# Patient Record
Sex: FEMALE | Race: WHITE | NOT HISPANIC OR LATINO | Employment: UNEMPLOYED | URBAN - METROPOLITAN AREA
[De-identification: names, ages, dates, MRNs, and addresses within clinical notes are randomized per-mention and may not be internally consistent; named-entity substitution may affect disease eponyms.]

---

## 2020-08-30 ENCOUNTER — OFFICE VISIT (OUTPATIENT)
Dept: URGENT CARE | Facility: CLINIC | Age: 25
End: 2020-08-30
Payer: COMMERCIAL

## 2020-08-30 VITALS
DIASTOLIC BLOOD PRESSURE: 74 MMHG | HEART RATE: 92 BPM | RESPIRATION RATE: 16 BRPM | SYSTOLIC BLOOD PRESSURE: 124 MMHG | OXYGEN SATURATION: 97 % | WEIGHT: 110 LBS | TEMPERATURE: 98 F

## 2020-08-30 DIAGNOSIS — F41.9 ANXIETY: ICD-10-CM

## 2020-08-30 DIAGNOSIS — R11.2 NAUSEA AND VOMITING, INTRACTABILITY OF VOMITING NOT SPECIFIED, UNSPECIFIED VOMITING TYPE: ICD-10-CM

## 2020-08-30 DIAGNOSIS — R19.7 DIARRHEA, UNSPECIFIED TYPE: ICD-10-CM

## 2020-08-30 LAB
APPEARANCE UR: NORMAL
BILIRUB UR STRIP-MCNC: NORMAL MG/DL
COLOR UR AUTO: YELLOW
GLUCOSE UR STRIP.AUTO-MCNC: NEGATIVE MG/DL
INT CON NEG: NEGATIVE
INT CON POS: POSITIVE
KETONES UR STRIP.AUTO-MCNC: 80 MG/DL
LEUKOCYTE ESTERASE UR QL STRIP.AUTO: NEGATIVE
NITRITE UR QL STRIP.AUTO: NEGATIVE
PH UR STRIP.AUTO: 5.5 [PH] (ref 5–8)
POC URINE PREGNANCY TEST: NEGATIVE
PROT UR QL STRIP: NORMAL MG/DL
RBC UR QL AUTO: NEGATIVE
SP GR UR STRIP.AUTO: 1.03
UROBILINOGEN UR STRIP-MCNC: 0.2 MG/DL

## 2020-08-30 PROCEDURE — 81002 URINALYSIS NONAUTO W/O SCOPE: CPT | Performed by: PHYSICIAN ASSISTANT

## 2020-08-30 PROCEDURE — 99204 OFFICE O/P NEW MOD 45 MIN: CPT | Performed by: PHYSICIAN ASSISTANT

## 2020-08-30 PROCEDURE — 81025 URINE PREGNANCY TEST: CPT | Performed by: PHYSICIAN ASSISTANT

## 2020-08-30 RX ORDER — HYDROXYZINE HYDROCHLORIDE 25 MG/1
25 TABLET, FILM COATED ORAL 3 TIMES DAILY PRN
Qty: 30 TAB | Refills: 0 | Status: SHIPPED | OUTPATIENT
Start: 2020-08-30 | End: 2020-11-07

## 2020-08-30 RX ORDER — ONDANSETRON 4 MG/1
4 TABLET, ORALLY DISINTEGRATING ORAL EVERY 8 HOURS PRN
Qty: 10 TAB | Refills: 0 | Status: SHIPPED | OUTPATIENT
Start: 2020-08-30 | End: 2020-11-08

## 2020-08-30 ASSESSMENT — ENCOUNTER SYMPTOMS
BACK PAIN: 0
VOMITING: 1
PALPITATIONS: 0
DIZZINESS: 0
COUGH: 0
BLOOD IN STOOL: 0
SHORTNESS OF BREATH: 0
ARTHRALGIAS: 0
SWEATS: 0
WHEEZING: 0
CHILLS: 0
BLOATING: 0
DIARRHEA: 1
ABDOMINAL PAIN: 1
SPUTUM PRODUCTION: 0
FEVER: 0
FLATUS: 0
HEADACHES: 0
MYALGIAS: 0

## 2020-08-30 NOTE — LETTER
August 30, 2020         Patient: Nanci Spencer   YOB: 1995   Date of Visit: 8/30/2020           To Whom it May Concern:    Nanci Spencer was seen in my clinic on 8/30/2020. She is excused from work from 8/30/2020-8/31/2020 due to medical reasons.     If you have any questions or concerns, please don't hesitate to call.        Sincerely,           Juana Irizarry P.A.-C.  Electronically Signed

## 2020-08-31 NOTE — PROGRESS NOTES
Subjective:      Nanci Spencer is a 24 y.o. female who presents with Abdominal Pain (x 1 day with diarrhea x 2 days and vomiting started today)            Diarrhea   This is a new problem. The current episode started yesterday. The problem occurs 2 to 4 times per day. The problem has been unchanged. The stool consistency is described as watery. The patient states that diarrhea does not awaken her from sleep. Associated symptoms include abdominal pain (upper abdominal pain - 5/10 pain) and vomiting. Pertinent negatives include no arthralgias, bloating, chills, coughing, fever, headaches, increased  flatus, myalgias, sweats or URI. There are no known risk factors (no recent antibiotic use, no ill contacts, no suspect food intake or recent hospitalization). Treatments tried: Zofran. The treatment provided mild relief.     The patient also reports having a lot of anxiety lately. She just moved here from out of town. She denies bloody stools. She was taking Klonopin daily for 3 weeks and stopped about 2 weeks ago. She reports this is similar to anxiety episodes in the past. She does smoke Marijuana multiple times per day.     No past medical history on file.    No past surgical history on file.  No family history on file.    No Known Allergies    Review of Systems   Constitutional: Negative for chills, fever and malaise/fatigue.   Respiratory: Negative for cough, sputum production, shortness of breath and wheezing.    Cardiovascular: Negative for chest pain, palpitations and leg swelling.   Gastrointestinal: Positive for abdominal pain (upper abdominal pain - 5/10 pain), diarrhea and vomiting. Negative for bloating, blood in stool, flatus and melena.   Genitourinary: Negative for dysuria, frequency, hematuria and urgency.   Musculoskeletal: Negative for arthralgias, back pain and myalgias.   Neurological: Negative for dizziness and headaches.     All other systems reviewed and are negative.          Objective:     BP  124/74 (BP Location: Left arm, Patient Position: Sitting, BP Cuff Size: Adult)   Pulse 92   Temp 36.7 °C (98 °F) (Temporal)   Resp 16   Wt 49.9 kg (110 lb)   SpO2 97%      Physical Exam  Constitutional:       General: She is not in acute distress.     Appearance: She is not ill-appearing.   HENT:      Head: Normocephalic and atraumatic.   Eyes:      Conjunctiva/sclera: Conjunctivae normal.   Cardiovascular:      Rate and Rhythm: Normal rate and regular rhythm.      Heart sounds: Normal heart sounds. No murmur. No friction rub. No gallop.    Pulmonary:      Effort: Pulmonary effort is normal. No respiratory distress.      Breath sounds: Normal breath sounds. No wheezing, rhonchi or rales.   Abdominal:      General: There is no distension.      Palpations: Abdomen is soft. There is no mass.      Tenderness: There is abdominal tenderness (mild TTP in epigastric and periumbilical areas). There is no right CVA tenderness, left CVA tenderness, guarding or rebound. Negative signs include Zapata's sign and McBurney's sign.   Musculoskeletal: Normal range of motion.   Skin:     General: Skin is warm and dry.   Neurological:      General: No focal deficit present.      Mental Status: She is alert and oriented to person, place, and time.   Psychiatric:         Mood and Affect: Mood normal.         Behavior: Behavior normal.         Thought Content: Thought content normal.         Judgment: Judgment normal.       UA: negative leuks, negative nitrates, no blood  HCG- negative            Assessment/Plan:        1. Nausea and vomiting, intractability of vomiting not specified, unspecified vomiting type  ondansetron (ZOFRAN ODT) 4 MG TABLET DISPERSIBLE    POCT Urinalysis    POCT Pregnancy   2. Diarrhea, unspecified type     3. Anxiety  hydrOXYzine HCl (ATARAX) 25 MG Tab       DDX: gastroenteritis, Anxiety, Cyclic vomiting syndrome from marijuana use.    Encouraged fluids  RX Zofran  RX Hydroxyzine  ER if worsening pain, fever,  or inability to keep fluids down.    Differential diagnoses, Supportive care, and indications for immediate follow-up discussed with patient.   Pathogenesis of diagnosis discussed including typical length and natural progression.   Instructed to return to clinic or nearest emergency department for any change in condition, further concerns, or worsening of symptoms.    The patient demonstrated a good understanding and agreed with the treatment plan.    Juana Irizarry P.A.-C.

## 2020-11-07 ENCOUNTER — HOSPITAL ENCOUNTER (EMERGENCY)
Facility: MEDICAL CENTER | Age: 25
End: 2020-11-07
Attending: EMERGENCY MEDICINE
Payer: COMMERCIAL

## 2020-11-07 ENCOUNTER — APPOINTMENT (OUTPATIENT)
Dept: RADIOLOGY | Facility: MEDICAL CENTER | Age: 25
End: 2020-11-07
Attending: EMERGENCY MEDICINE
Payer: COMMERCIAL

## 2020-11-07 VITALS
HEART RATE: 99 BPM | WEIGHT: 113.54 LBS | DIASTOLIC BLOOD PRESSURE: 78 MMHG | OXYGEN SATURATION: 99 % | HEIGHT: 66 IN | TEMPERATURE: 98.2 F | SYSTOLIC BLOOD PRESSURE: 129 MMHG | RESPIRATION RATE: 15 BRPM | BODY MASS INDEX: 18.25 KG/M2

## 2020-11-07 DIAGNOSIS — O21.0 HYPEREMESIS GRAVIDARUM: ICD-10-CM

## 2020-11-07 LAB
ALBUMIN SERPL BCP-MCNC: 4.4 G/DL (ref 3.2–4.9)
ALBUMIN/GLOB SERPL: 1.6 G/DL
ALP SERPL-CCNC: 65 U/L (ref 30–99)
ALT SERPL-CCNC: 19 U/L (ref 2–50)
ANION GAP SERPL CALC-SCNC: 17 MMOL/L (ref 7–16)
APPEARANCE UR: ABNORMAL
AST SERPL-CCNC: 22 U/L (ref 12–45)
B-HCG SERPL-ACNC: ABNORMAL MIU/ML (ref 0–5)
BACTERIA #/AREA URNS HPF: ABNORMAL /HPF
BASOPHILS # BLD AUTO: 0.3 % (ref 0–1.8)
BASOPHILS # BLD: 0.05 K/UL (ref 0–0.12)
BILIRUB SERPL-MCNC: 0.4 MG/DL (ref 0.1–1.5)
BILIRUB UR QL STRIP.AUTO: NEGATIVE
BUN SERPL-MCNC: 10 MG/DL (ref 8–22)
CALCIUM SERPL-MCNC: 9.4 MG/DL (ref 8.5–10.5)
CHLORIDE SERPL-SCNC: 101 MMOL/L (ref 96–112)
CO2 SERPL-SCNC: 18 MMOL/L (ref 20–33)
COLOR UR: YELLOW
CREAT SERPL-MCNC: 0.65 MG/DL (ref 0.5–1.4)
EOSINOPHIL # BLD AUTO: 0 K/UL (ref 0–0.51)
EOSINOPHIL NFR BLD: 0 % (ref 0–6.9)
EPI CELLS #/AREA URNS HPF: ABNORMAL /HPF
ERYTHROCYTE [DISTWIDTH] IN BLOOD BY AUTOMATED COUNT: 40.6 FL (ref 35.9–50)
GLOBULIN SER CALC-MCNC: 2.7 G/DL (ref 1.9–3.5)
GLUCOSE SERPL-MCNC: 95 MG/DL (ref 65–99)
GLUCOSE UR STRIP.AUTO-MCNC: NEGATIVE MG/DL
HCT VFR BLD AUTO: 42 % (ref 37–47)
HGB BLD-MCNC: 14.1 G/DL (ref 12–16)
HYALINE CASTS #/AREA URNS LPF: ABNORMAL /LPF
IMM GRANULOCYTES # BLD AUTO: 0.1 K/UL (ref 0–0.11)
IMM GRANULOCYTES NFR BLD AUTO: 0.6 % (ref 0–0.9)
KETONES UR STRIP.AUTO-MCNC: >=160 MG/DL
LEUKOCYTE ESTERASE UR QL STRIP.AUTO: NEGATIVE
LIPASE SERPL-CCNC: 18 U/L (ref 11–82)
LYMPHOCYTES # BLD AUTO: 1.09 K/UL (ref 1–4.8)
LYMPHOCYTES NFR BLD: 6.9 % (ref 22–41)
MCH RBC QN AUTO: 29.7 PG (ref 27–33)
MCHC RBC AUTO-ENTMCNC: 33.6 G/DL (ref 33.6–35)
MCV RBC AUTO: 88.6 FL (ref 81.4–97.8)
MICRO URNS: ABNORMAL
MONOCYTES # BLD AUTO: 0.68 K/UL (ref 0–0.85)
MONOCYTES NFR BLD AUTO: 4.3 % (ref 0–13.4)
MUCOUS THREADS #/AREA URNS HPF: ABNORMAL /HPF
NEUTROPHILS # BLD AUTO: 13.84 K/UL (ref 2–7.15)
NEUTROPHILS NFR BLD: 87.9 % (ref 44–72)
NITRITE UR QL STRIP.AUTO: NEGATIVE
NRBC # BLD AUTO: 0 K/UL
NRBC BLD-RTO: 0 /100 WBC
PH UR STRIP.AUTO: 6 [PH] (ref 5–8)
PLATELET # BLD AUTO: 343 K/UL (ref 164–446)
PMV BLD AUTO: 9.7 FL (ref 9–12.9)
POTASSIUM SERPL-SCNC: 3.6 MMOL/L (ref 3.6–5.5)
PROT SERPL-MCNC: 7.1 G/DL (ref 6–8.2)
PROT UR QL STRIP: 30 MG/DL
RBC # BLD AUTO: 4.74 M/UL (ref 4.2–5.4)
RBC # URNS HPF: ABNORMAL /HPF
RBC UR QL AUTO: NEGATIVE
SODIUM SERPL-SCNC: 136 MMOL/L (ref 135–145)
SP GR UR STRIP.AUTO: 1.03
UROBILINOGEN UR STRIP.AUTO-MCNC: 0.2 MG/DL
WBC # BLD AUTO: 15.8 K/UL (ref 4.8–10.8)
WBC #/AREA URNS HPF: ABNORMAL /HPF

## 2020-11-07 PROCEDURE — 96361 HYDRATE IV INFUSION ADD-ON: CPT

## 2020-11-07 PROCEDURE — 99285 EMERGENCY DEPT VISIT HI MDM: CPT

## 2020-11-07 PROCEDURE — 700111 HCHG RX REV CODE 636 W/ 250 OVERRIDE (IP)

## 2020-11-07 PROCEDURE — 700105 HCHG RX REV CODE 258: Performed by: EMERGENCY MEDICINE

## 2020-11-07 PROCEDURE — 96376 TX/PRO/DX INJ SAME DRUG ADON: CPT

## 2020-11-07 PROCEDURE — 80053 COMPREHEN METABOLIC PANEL: CPT

## 2020-11-07 PROCEDURE — 700111 HCHG RX REV CODE 636 W/ 250 OVERRIDE (IP): Performed by: EMERGENCY MEDICINE

## 2020-11-07 PROCEDURE — 83690 ASSAY OF LIPASE: CPT

## 2020-11-07 PROCEDURE — 85025 COMPLETE CBC W/AUTO DIFF WBC: CPT

## 2020-11-07 PROCEDURE — 87086 URINE CULTURE/COLONY COUNT: CPT

## 2020-11-07 PROCEDURE — 96375 TX/PRO/DX INJ NEW DRUG ADDON: CPT

## 2020-11-07 PROCEDURE — 84702 CHORIONIC GONADOTROPIN TEST: CPT

## 2020-11-07 PROCEDURE — 96374 THER/PROPH/DIAG INJ IV PUSH: CPT

## 2020-11-07 PROCEDURE — 76801 OB US < 14 WKS SINGLE FETUS: CPT

## 2020-11-07 PROCEDURE — 81001 URINALYSIS AUTO W/SCOPE: CPT

## 2020-11-07 RX ORDER — DIPHENHYDRAMINE HYDROCHLORIDE 50 MG/ML
25 INJECTION INTRAMUSCULAR; INTRAVENOUS ONCE
Status: COMPLETED | OUTPATIENT
Start: 2020-11-07 | End: 2020-11-07

## 2020-11-07 RX ORDER — QUETIAPINE FUMARATE 50 MG/1
100 TABLET, FILM COATED ORAL
COMMUNITY
End: 2020-12-08 | Stop reason: SDUPTHER

## 2020-11-07 RX ORDER — SODIUM CHLORIDE, SODIUM LACTATE, POTASSIUM CHLORIDE, CALCIUM CHLORIDE 600; 310; 30; 20 MG/100ML; MG/100ML; MG/100ML; MG/100ML
2000 INJECTION, SOLUTION INTRAVENOUS ONCE
Status: COMPLETED | OUTPATIENT
Start: 2020-11-07 | End: 2020-11-07

## 2020-11-07 RX ORDER — ONDANSETRON 2 MG/ML
4 INJECTION INTRAMUSCULAR; INTRAVENOUS ONCE
Status: COMPLETED | OUTPATIENT
Start: 2020-11-07 | End: 2020-11-07

## 2020-11-07 RX ADMIN — ONDANSETRON 4 MG: 2 INJECTION INTRAMUSCULAR; INTRAVENOUS at 20:06

## 2020-11-07 RX ADMIN — SODIUM CHLORIDE, POTASSIUM CHLORIDE, SODIUM LACTATE AND CALCIUM CHLORIDE 2000 ML: 600; 310; 30; 20 INJECTION, SOLUTION INTRAVENOUS at 17:38

## 2020-11-07 RX ADMIN — ONDANSETRON 4 MG: 2 INJECTION INTRAMUSCULAR; INTRAVENOUS at 17:37

## 2020-11-07 RX ADMIN — DIPHENHYDRAMINE HYDROCHLORIDE 25 MG: 50 INJECTION INTRAMUSCULAR; INTRAVENOUS at 20:35

## 2020-11-08 ENCOUNTER — HOSPITAL ENCOUNTER (EMERGENCY)
Facility: MEDICAL CENTER | Age: 25
End: 2020-11-08
Attending: EMERGENCY MEDICINE
Payer: COMMERCIAL

## 2020-11-08 ENCOUNTER — HOSPITAL ENCOUNTER (EMERGENCY)
Facility: MEDICAL CENTER | Age: 25
End: 2020-11-09
Attending: EMERGENCY MEDICINE | Admitting: EMERGENCY MEDICINE
Payer: COMMERCIAL

## 2020-11-08 VITALS
HEIGHT: 66 IN | OXYGEN SATURATION: 99 % | WEIGHT: 113.54 LBS | RESPIRATION RATE: 18 BRPM | BODY MASS INDEX: 18.25 KG/M2 | SYSTOLIC BLOOD PRESSURE: 128 MMHG | DIASTOLIC BLOOD PRESSURE: 66 MMHG | HEART RATE: 81 BPM

## 2020-11-08 DIAGNOSIS — R11.2 NAUSEA AND VOMITING, INTRACTABILITY OF VOMITING NOT SPECIFIED, UNSPECIFIED VOMITING TYPE: ICD-10-CM

## 2020-11-08 DIAGNOSIS — R11.2 NAUSEA AND VOMITING IN ADULT: ICD-10-CM

## 2020-11-08 DIAGNOSIS — Z34.91 FIRST TRIMESTER PREGNANCY: ICD-10-CM

## 2020-11-08 DIAGNOSIS — Z3A.01 LESS THAN 8 WEEKS GESTATION OF PREGNANCY: ICD-10-CM

## 2020-11-08 DIAGNOSIS — R10.13 EPIGASTRIC ABDOMINAL PAIN: ICD-10-CM

## 2020-11-08 LAB
ALBUMIN SERPL BCP-MCNC: 3.7 G/DL (ref 3.2–4.9)
ALBUMIN/GLOB SERPL: 1.5 G/DL
ALP SERPL-CCNC: 56 U/L (ref 30–99)
ALT SERPL-CCNC: 17 U/L (ref 2–50)
ANION GAP SERPL CALC-SCNC: 13 MMOL/L (ref 7–16)
AST SERPL-CCNC: 21 U/L (ref 12–45)
BASOPHILS # BLD AUTO: 0.3 % (ref 0–1.8)
BASOPHILS # BLD: 0.05 K/UL (ref 0–0.12)
BILIRUB SERPL-MCNC: 0.5 MG/DL (ref 0.1–1.5)
BUN SERPL-MCNC: 8 MG/DL (ref 8–22)
CALCIUM SERPL-MCNC: 8.5 MG/DL (ref 8.4–10.2)
CHLORIDE SERPL-SCNC: 103 MMOL/L (ref 96–112)
CO2 SERPL-SCNC: 18 MMOL/L (ref 20–33)
CREAT SERPL-MCNC: 0.7 MG/DL (ref 0.5–1.4)
EOSINOPHIL # BLD AUTO: 0.01 K/UL (ref 0–0.51)
EOSINOPHIL NFR BLD: 0.1 % (ref 0–6.9)
ERYTHROCYTE [DISTWIDTH] IN BLOOD BY AUTOMATED COUNT: 40.1 FL (ref 35.9–50)
GLOBULIN SER CALC-MCNC: 2.4 G/DL (ref 1.9–3.5)
GLUCOSE SERPL-MCNC: 109 MG/DL (ref 65–99)
HCT VFR BLD AUTO: 36.6 % (ref 37–47)
HGB BLD-MCNC: 12.3 G/DL (ref 12–16)
IMM GRANULOCYTES # BLD AUTO: 0.11 K/UL (ref 0–0.11)
IMM GRANULOCYTES NFR BLD AUTO: 0.7 % (ref 0–0.9)
LIPASE SERPL-CCNC: 15 U/L (ref 7–58)
LYMPHOCYTES # BLD AUTO: 1.31 K/UL (ref 1–4.8)
LYMPHOCYTES NFR BLD: 8.4 % (ref 22–41)
MCH RBC QN AUTO: 29.6 PG (ref 27–33)
MCHC RBC AUTO-ENTMCNC: 33.6 G/DL (ref 33.6–35)
MCV RBC AUTO: 88.2 FL (ref 81.4–97.8)
MONOCYTES # BLD AUTO: 0.53 K/UL (ref 0–0.85)
MONOCYTES NFR BLD AUTO: 3.4 % (ref 0–13.4)
NEUTROPHILS # BLD AUTO: 13.62 K/UL (ref 2–7.15)
NEUTROPHILS NFR BLD: 87.1 % (ref 44–72)
NRBC # BLD AUTO: 0 K/UL
NRBC BLD-RTO: 0 /100 WBC
PLATELET # BLD AUTO: 274 K/UL (ref 164–446)
PMV BLD AUTO: 9.8 FL (ref 9–12.9)
POTASSIUM SERPL-SCNC: 3.7 MMOL/L (ref 3.6–5.5)
PROT SERPL-MCNC: 6.1 G/DL (ref 6–8.2)
RBC # BLD AUTO: 4.15 M/UL (ref 4.2–5.4)
SODIUM SERPL-SCNC: 134 MMOL/L (ref 135–145)
WBC # BLD AUTO: 15.6 K/UL (ref 4.8–10.8)

## 2020-11-08 PROCEDURE — 96375 TX/PRO/DX INJ NEW DRUG ADDON: CPT

## 2020-11-08 PROCEDURE — 80053 COMPREHEN METABOLIC PANEL: CPT

## 2020-11-08 PROCEDURE — 99284 EMERGENCY DEPT VISIT MOD MDM: CPT

## 2020-11-08 PROCEDURE — 85025 COMPLETE CBC W/AUTO DIFF WBC: CPT

## 2020-11-08 PROCEDURE — 83690 ASSAY OF LIPASE: CPT | Mod: 91

## 2020-11-08 PROCEDURE — 96374 THER/PROPH/DIAG INJ IV PUSH: CPT

## 2020-11-08 PROCEDURE — A9270 NON-COVERED ITEM OR SERVICE: HCPCS | Performed by: EMERGENCY MEDICINE

## 2020-11-08 PROCEDURE — 84702 CHORIONIC GONADOTROPIN TEST: CPT

## 2020-11-08 PROCEDURE — 80053 COMPREHEN METABOLIC PANEL: CPT | Mod: 91

## 2020-11-08 PROCEDURE — 700111 HCHG RX REV CODE 636 W/ 250 OVERRIDE (IP): Performed by: EMERGENCY MEDICINE

## 2020-11-08 PROCEDURE — 36415 COLL VENOUS BLD VENIPUNCTURE: CPT

## 2020-11-08 PROCEDURE — 700102 HCHG RX REV CODE 250 W/ 637 OVERRIDE(OP): Performed by: EMERGENCY MEDICINE

## 2020-11-08 PROCEDURE — 85025 COMPLETE CBC W/AUTO DIFF WBC: CPT | Mod: 91

## 2020-11-08 PROCEDURE — 83690 ASSAY OF LIPASE: CPT

## 2020-11-08 RX ORDER — PYRIDOXINE HCL (VITAMIN B6) 25 MG
25 TABLET ORAL ONCE
Status: COMPLETED | OUTPATIENT
Start: 2020-11-08 | End: 2020-11-08

## 2020-11-08 RX ORDER — METOCLOPRAMIDE HYDROCHLORIDE 5 MG/ML
10 INJECTION INTRAMUSCULAR; INTRAVENOUS ONCE
Status: COMPLETED | OUTPATIENT
Start: 2020-11-09 | End: 2020-11-09

## 2020-11-08 RX ORDER — LORAZEPAM 2 MG/ML
1 INJECTION INTRAMUSCULAR ONCE
Status: COMPLETED | OUTPATIENT
Start: 2020-11-09 | End: 2020-11-09

## 2020-11-08 RX ORDER — ONDANSETRON 4 MG/1
4 TABLET, ORALLY DISINTEGRATING ORAL EVERY 6 HOURS PRN
Qty: 20 TAB | Refills: 0 | Status: SHIPPED | OUTPATIENT
Start: 2020-11-08

## 2020-11-08 RX ORDER — ONDANSETRON 2 MG/ML
4 INJECTION INTRAMUSCULAR; INTRAVENOUS ONCE
Status: COMPLETED | OUTPATIENT
Start: 2020-11-08 | End: 2020-11-08

## 2020-11-08 RX ORDER — DIPHENHYDRAMINE HYDROCHLORIDE 50 MG/ML
25 INJECTION INTRAMUSCULAR; INTRAVENOUS ONCE
Status: COMPLETED | OUTPATIENT
Start: 2020-11-09 | End: 2020-11-09

## 2020-11-08 RX ORDER — SODIUM CHLORIDE 9 MG/ML
1000 INJECTION, SOLUTION INTRAVENOUS ONCE
Status: COMPLETED | OUTPATIENT
Start: 2020-11-09 | End: 2020-11-09

## 2020-11-08 RX ORDER — LORAZEPAM 2 MG/ML
0.5 INJECTION INTRAMUSCULAR ONCE
Status: COMPLETED | OUTPATIENT
Start: 2020-11-08 | End: 2020-11-08

## 2020-11-08 RX ADMIN — ONDANSETRON 4 MG: 2 INJECTION INTRAMUSCULAR; INTRAVENOUS at 04:19

## 2020-11-08 RX ADMIN — Medication 25 MG: at 04:19

## 2020-11-08 RX ADMIN — LIDOCAINE HYDROCHLORIDE 30 ML: 20 SOLUTION OROPHARYNGEAL at 04:20

## 2020-11-08 RX ADMIN — LORAZEPAM 0.5 MG: 2 INJECTION INTRAMUSCULAR; INTRAVENOUS at 04:52

## 2020-11-08 SDOH — HEALTH STABILITY: MENTAL HEALTH: HOW OFTEN DO YOU HAVE A DRINK CONTAINING ALCOHOL?: NEVER

## 2020-11-08 ASSESSMENT — ENCOUNTER SYMPTOMS
ABDOMINAL PAIN: 1
NAUSEA: 1
VOMITING: 1
FEVER: 0
DIZZINESS: 0
SHORTNESS OF BREATH: 0
BACK PAIN: 0
HEADACHES: 0

## 2020-11-08 ASSESSMENT — PAIN DESCRIPTION - DESCRIPTORS: DESCRIPTORS: BURNING

## 2020-11-08 ASSESSMENT — FIBROSIS 4 INDEX
FIB4 SCORE: 0.43
FIB4 SCORE: 0.34

## 2020-11-08 NOTE — ED TRIAGE NOTES
"Nanci Spencer 24 y.o. female ambulatory to triage for     Chief Complaint   Patient presents with   • Abdominal Pain     epigastric pain, x 48 hours, ache, constant, better after vomiting then reoccurs within 2-3 minutes   • Nausea/Vomiting/Diarrhea     x 48 hours, unable to keep any food down, last emesis 1 hour ago, last watery BM this morning   • Tingling     hands/fingers tingling, pt hyperventilating   • Sweats     Pt anxious, c/o having frequent panic attacks.  Pt reports approx 7 weeks pregnant, moved to [place] approx 2 months ago and does not have OB/GYN or pre- care.  Pt has an old prescription for zofran which she has been taking, last dose this morning.  /98   Pulse (!) 137   Temp 36.5 °C (97.7 °F) (Temporal)   Resp 16   Ht 1.676 m (5' 6\")   Wt 51.5 kg (113 lb 8.6 oz)   LMP 2020 (Approximate)   SpO2 98%   BMI 18.33 kg/m²   Protocol orders placed.  Pt returned to the lobby to await bed assignment.  Advised to return to the triage desk for any changes/concerns.  "

## 2020-11-08 NOTE — ED NOTES
RN at bedside, pt is still quite nauseated after previous zofran administration. Dr. Bermudez notified for further orders.

## 2020-11-08 NOTE — ED PROVIDER NOTES
ED Provider Note    Primary care provider: Pcp Pt States None  Means of arrival: EMS  History obtained from: patient  History limited by: none    CHIEF COMPLAINT  Chief Complaint   Patient presents with   • N/V       HPI  Nanci Spencer is a  24 y.o. female who presents to the Emergency Department 24-year-old female who comes in for evaluation of abdominal pain, nausea and vomiting.  Patient is 7 weeks and 6 days by ultrasound that was done yesterday.  Patient seen in the emergency department for epigastric pain, nausea and vomiting yesterday and after being treated symptomatically was feeling improved and subsequently discharged.  She was thought to have hyperemesis gravidarum related to pregnancy.  Patient returns tonight with ongoing epigastric pain that she describes as burning and moderate in severity.  With inability to tolerate oral intake throughout the day.  She denies any vaginal bleeding or discharge.  Has no lower abdominal cramping.  Patient does have a history of smoking marijuana and is still continues to smoke.  Denies fevers, chills, dysuria, cough, chest pain.    REVIEW OF SYSTEMS  Review of Systems   Constitutional: Negative for fever.   Respiratory: Negative for shortness of breath.    Cardiovascular: Negative for chest pain.   Gastrointestinal: Positive for abdominal pain, nausea and vomiting.   Genitourinary: Negative for dysuria.   Musculoskeletal: Negative for back pain.   Neurological: Negative for dizziness and headaches.   All other systems reviewed and are negative.        PAST MEDICAL HISTORY   None    SURGICAL HISTORY  patient denies any surgical history    SOCIAL HISTORY  Social History     Tobacco Use   • Smoking status: No   Substance Use Topics   • Alcohol use: No   • Drug use: No      Social History     Substance and Sexual Activity   Drug Use  positive for marijuana use       FAMILY HISTORY  None pertinent    CURRENT MEDICATIONS  Home Medications     Reviewed by Lb AVALOS  "EDITH Lim (Registered Nurse) on 11/08/20 at 0426  Med List Status: Partial   Medication Last Dose Status   ondansetron (ZOFRAN ODT) 4 MG TABLET DISPERSIBLE  Active   QUEtiapine (SEROQUEL) 50 MG tablet  Active                ALLERGIES  No Known Allergies    PHYSICAL EXAM  VITAL SIGNS: /70   Pulse 87   Resp 20   Ht 1.676 m (5' 6\")   Wt 51.5 kg (113 lb 8.6 oz)   LMP 09/27/2020 (Approximate)   SpO2 99%   Breastfeeding No   BMI 18.33 kg/m²   Vitals reviewed by myself.  Physical Exam   Constitutional: She is oriented to person, place, and time.   Patient is anxious appearing, dry heaving on exam   HENT:   Head: Normocephalic and atraumatic.   Eyes: Pupils are equal, round, and reactive to light. EOM are normal.   Neck: Normal range of motion. Neck supple.   Cardiovascular: Normal rate, regular rhythm and normal heart sounds. Exam reveals no gallop and no friction rub.   No murmur heard.  Pulmonary/Chest: Effort normal and breath sounds normal. No respiratory distress. She has no wheezes. She has no rales.   Abdominal: Soft. She exhibits no distension. There is no rebound and no guarding.   Mild epigastric tenderness without rebound or guarding, nonperitoneal   Musculoskeletal: Normal range of motion.         General: No edema.   Neurological: She is alert and oriented to person, place, and time.         DIAGNOSTIC STUDIES /  LABS  Labs Reviewed   CBC WITH DIFFERENTIAL - Abnormal; Notable for the following components:       Result Value    WBC 15.6 (*)     RBC 4.15 (*)     Hematocrit 36.6 (*)     Neutrophils-Polys 87.10 (*)     Lymphocytes 8.40 (*)     Neutrophils (Absolute) 13.62 (*)     All other components within normal limits   COMP METABOLIC PANEL - Abnormal; Notable for the following components:    Sodium 134 (*)     Co2 18 (*)     Glucose 109 (*)     All other components within normal limits   LIPASE   ESTIMATED GFR   URINALYSIS,CULTURE IF INDICATED       All labs reviewed by " me.      RADIOLOGY  none    COURSE & MEDICAL DECISION MAKING  Nursing notes, VS, PMSFHx reviewed in chart.    Patient is a 24-year-old female at 7 weeks pregnant who presents for nausea, vomiting and abdominal pain.  Differential diagnosis includes cyclic vomiting syndrome, gastritis, normal pregnancy, hyperemesis gravidarum, electrolyte disturbance, pancreatitis, biliary pathology.  Diagnostic work-up includes labs.    Patient's initial vitals are within normal limits.  She is anxious appearing and dry heaving.  Patient is treated with GI cocktail, Zofran, doxylamine, B6 and Ativan.  After treatment patient is feeling greatly improved.  Labs returned and patient has a leukocytosis of 15.6, this is relatively unchanged from yesterday when it was 15.8, likely reactive secondary to her nausea and vomiting.  Her anion gap was elevated yesterday related to dehydration this is subsequently resolved today.  Labs are otherwise relatively unremarkable.  Lipase is within normal limits as are her LFTs making biliary pathology unlikely.  Upon reassessment patient is feeling improved and tolerating oral intake.  I had a long discussion with her regarding the importance of marijuana cessation to prevent cyclic vomiting and also because she is pregnant.  Patient is amenable to this plan.  She is provided with prescriptions for Zofran and doxylamine and will follow up with her obstetrician tomorrow as planned.  Patient is then given strict return precautions and discharged in stable condition.      FINAL IMPRESSION  1. Nausea and vomiting, intractability of vomiting not specified, unspecified vomiting type    2. Less than 8 weeks gestation of pregnancy

## 2020-11-08 NOTE — ED NOTES
Patient is stable for discharge at this time, anticipatory guidance provided, close follow-up is encouraged, and ED return instructions have been detailed. Patient is both agreeable to the disposition and plan and discharged home in ambulatory state and in good condition.     Rx education provided, Pt verbalized understanding;

## 2020-11-08 NOTE — ED PROVIDER NOTES
ED Provider Note    CHIEF COMPLAINT  Chief Complaint   Patient presents with   • Abdominal Pain     epigastric pain, x 48 hours, ache, constant, better after vomiting then reoccurs within 2-3 minutes   • Nausea/Vomiting/Diarrhea     x 48 hours, unable to keep any food down, last emesis 1 hour ago, last watery BM this morning   • Tingling     hands/fingers tingling, pt hyperventilating   • Sweats       SORIN Spencer is a 24 y.o. female who presents with intractable nausea and vomiting and epigastric pain.  Started about 48 hours ago.  Patient states that her last menstrual cycle was September 27 and she is currently pregnant -approximately 6 weeks gestation by dates.  She has been followed by [facility] and had a transvaginal ultrasound performed in the past confirming an IUP.  This is her first pregnancy.  Denies prior abdominal surgeries in the past.  No fevers.  No bloody emesis or stool.  Had 1 or 2 episodes of diarrhea over the past 2 days however has had over a dozen episodes of vomiting in the past 48 hours.  No vaginal bleeding or discharge.  No dysuria or hematuria.  Denies taking any routine medications other than Seroquel which she takes for chronic anxiety.  She denies taking over-the-counter medications such as aspirin or ibuprofen.    REVIEW OF SYSTEMS  See HPI for further details. All other systems are negative.     PAST MEDICAL HISTORY       SOCIAL HISTORY  Social History     Tobacco Use   • Smoking status: Not on file   Substance and Sexual Activity   • Alcohol use: Not on file   • Drug use: Not on file   • Sexual activity: Not on file       SURGICAL HISTORY  patient denies any surgical history    CURRENT MEDICATIONS  Home Medications     Reviewed by Doreen Zhao R.N. (Registered Nurse) on 11/07/20 at 1647  Med List Status: Complete   Medication Last Dose Status   ondansetron (ZOFRAN ODT) 4 MG TABLET DISPERSIBLE 11/7/2020 Active   QUEtiapine (SEROQUEL) 50 MG tablet 11/6/2020 Active     "            ALLERGIES  No Known Allergies    PHYSICAL EXAM  VITAL SIGNS: /98   Pulse (!) 137   Temp 36.5 °C (97.7 °F) (Temporal)   Resp 16   Ht 1.676 m (5' 6\")   Wt 51.5 kg (113 lb 8.6 oz)   LMP 09/27/2020 (Approximate)   SpO2 98%   BMI 18.33 kg/m²   Pulse ox interpretation: I interpret this pulse ox as normal.  Constitutional: Alert in no apparent distress.  HENT: No signs of trauma, Bilateral external ears normal, Nose normal.   Eyes: Pupils are equal and reactive, Conjunctiva normal, Non-icteric.   Neck: Normal range of motion, No tenderness, Supple, No stridor.   Cardiovascular: Tachycardic rate and regular rhythm.   Thorax & Lungs: Normal breath sounds, No respiratory distress, No wheezing, No chest tenderness.   Abdomen: Bowel sounds normal, Soft, No tenderness, No masses, No pulsatile masses. No peritoneal signs.  Skin: Warm, Dry, No erythema, No rash.   Back: No bony tenderness, No CVA tenderness.   Extremities: Intact distal pulses, No edema, No tenderness, No cyanosis  Musculoskeletal: Good range of motion in all major joints. No tenderness to palpation or major deformities noted.   Neurologic: Alert, Normal motor function and gait, Normal sensory function, No focal deficits noted.   Psychiatric: Anxious in appearance.      DIAGNOSTIC STUDIES / PROCEDURES    EKG - Physician interpretation  No results found for this or any previous visit.      LABS  Labs Reviewed   CBC WITH DIFFERENTIAL - Abnormal; Notable for the following components:       Result Value    WBC 15.8 (*)     Neutrophils-Polys 87.90 (*)     Lymphocytes 6.90 (*)     Neutrophils (Absolute) 13.84 (*)     All other components within normal limits   COMP METABOLIC PANEL - Abnormal; Notable for the following components:    Co2 18 (*)     Anion Gap 17.0 (*)     All other components within normal limits   HCG QUANTITATIVE - Abnormal; Notable for the following components:    Bhcg 634483.0 (*)     All other components within normal limits "   URINALYSIS,CULTURE IF INDICATED - Abnormal; Notable for the following components:    Character Cloudy (*)     Protein 30 (*)     All other components within normal limits    Narrative:     Indication for culture:->Pregnant women: fever and/or  asymptomatic screening   URINE MICROSCOPIC (W/UA) - Abnormal; Notable for the following components:    WBC 10-20 (*)     RBC 5-10 (*)     Bacteria Many (*)     Epithelial Cells Many (*)     All other components within normal limits    Narrative:     Indication for culture:->Pregnant women: fever and/or  asymptomatic screening   LIPASE   ESTIMATED GFR   URINE CULTURE(NEW)         RADIOLOGY  US-OB 1ST TRIMESTER WITH TRANSVAGINAL (COMBO)   Final Result      Viable single intrauterine gestation of an estimated 7 weeks 5 days.            COURSE & MEDICAL DECISION MAKING    Medications   lactated ringers infusion (BOLUS) (0 mL Intravenous Stopped 11/7/20 2049)   ondansetron (ZOFRAN) syringe/vial injection 4 mg (4 mg Intravenous Given 11/7/20 1737)   ondansetron (ZOFRAN) syringe/vial injection 4 mg (4 mg Intravenous Given 11/7/20 2006)   diphenhydrAMINE (BENADRYL) injection 25 mg (25 mg Intravenous Given 11/7/20 2035)       Pertinent Labs & Imaging studies reviewed. (See chart for details)  24 y.o. female presenting with anxiety and intractable nausea and vomiting for the past 2 days.  Also with epigastric pain.  She states that she takes Seroquel.  She also notes that she is approximately 6 weeks pregnant.  She is followed by the pregnancy center.  She presented with tachycardia and appears very anxious.  She was given IV fluid hydration due to concerns of dehydration from repeated bouts of emesis.    HYDRATION: Based on the patient's presentation of Acute Vomiting, Dehydration, Inability to take oral fluids and Tachycardia the patient was given IV fluids. IV Hydration was used because oral hydration was not as rapid as required. Upon recheck following hydration, the patient was  "improved.    Laboratory studies were performed that were largely unremarkable.  Does have leukocytosis of uncertain significance.  No obvious source of infection.  I suspect that it may be related to her pregnancy.  Chemistries showing slight metabolic acidosis likely related to repeated bouts of vomiting and dehydration.  Normal liver enzymes and alk phos and T bili.  Negative lipase and no signs of pancreatitis. the patient does not have active symptoms to suggest urinary tract infection such as dysuria.  Urinalysis appears to be contaminated with many epithelial cells and likely an unreliable sample.  It did show ketones consistent with the patient's repeated bouts of vomiting and likely dehydration.    Patient was given Zofran for nausea and also given Benadryl IV.  She reports feeling improved.  Heart rate is improved compared to initial presentation.    Patient has a follow-up visit with her OB on Monday.  Recommending outpatient management with her OB physician.  No obvious signs of an acute abdomen.  Ultrasound of the pelvis showing no unremarkable IUP without evidence of ectopic pregnancy.  No vaginal bleeding.    I suspect that the patient's vomiting symptoms are related to her pregnancy.  Likely related to hyperemesis gravidarum in the first trimester.  She does have Zofran at home.  Has a follow-up appointment in 2 days with OB.  She was given aggressive IV fluid hydration here and able to tolerate oral intake here prior to discharge.    The patient was instructed to follow-up with primary care physician for further management.  To return immediately for any worsening symptoms or development of any other concerning signs or symptoms. The patient verbalizes understanding in their own words.    /78   Pulse 99   Temp 36.8 °C (98.2 °F) (Temporal)   Resp 15   Ht 1.676 m (5' 6\")   Wt 51.5 kg (113 lb 8.6 oz)   LMP 09/27/2020 (Approximate)   SpO2 99%   BMI 18.33 kg/m²     The patient was referred " to primary care where they will receive further BP management.      [facility], Emergency Dept  [address] [address] [phone]    As needed, If symptoms worsen    OB physician    Go on 11/9/2020        FINAL IMPRESSION  1. Hyperemesis gravidarum            Electronically signed by: Kilo Bermudez M.D., 11/7/2020 5:17 PM

## 2020-11-08 NOTE — ED NOTES
Pt c/o ABD Pn and N/V, was seen last night for same thing;     Pt 7-wks pregnant;    REMSA gave Pt 4mg Zofran PO;

## 2020-11-08 NOTE — ED NOTES
Discharge teaching and paperwork provided regarding hyperemesis gravidarum and all questions/concerns answered. VSS, abdominal assessment stable and PIV removed. Patient discharged to the care of significant other and ambulated out of the ED.

## 2020-11-09 VITALS
WEIGHT: 112.88 LBS | HEART RATE: 76 BPM | RESPIRATION RATE: 16 BRPM | BODY MASS INDEX: 18.14 KG/M2 | TEMPERATURE: 97 F | DIASTOLIC BLOOD PRESSURE: 68 MMHG | SYSTOLIC BLOOD PRESSURE: 114 MMHG | HEIGHT: 66 IN | OXYGEN SATURATION: 98 %

## 2020-11-09 LAB
ALBUMIN SERPL BCP-MCNC: 4.2 G/DL (ref 3.2–4.9)
ALBUMIN/GLOB SERPL: 1.7 G/DL
ALP SERPL-CCNC: 56 U/L (ref 30–99)
ALT SERPL-CCNC: 18 U/L (ref 2–50)
ANION GAP SERPL CALC-SCNC: 17 MMOL/L (ref 7–16)
APPEARANCE UR: CLEAR
AST SERPL-CCNC: 23 U/L (ref 12–45)
B-HCG SERPL-ACNC: ABNORMAL MIU/ML (ref 0–5)
BASOPHILS # BLD AUTO: 0.5 % (ref 0–1.8)
BASOPHILS # BLD: 0.07 K/UL (ref 0–0.12)
BILIRUB SERPL-MCNC: 0.7 MG/DL (ref 0.1–1.5)
BILIRUB UR QL STRIP.AUTO: NEGATIVE
BUN SERPL-MCNC: 9 MG/DL (ref 8–22)
CALCIUM SERPL-MCNC: 9.2 MG/DL (ref 8.5–10.5)
CHLORIDE SERPL-SCNC: 103 MMOL/L (ref 96–112)
CO2 SERPL-SCNC: 16 MMOL/L (ref 20–33)
COLOR UR: YELLOW
CREAT SERPL-MCNC: 0.59 MG/DL (ref 0.5–1.4)
EOSINOPHIL # BLD AUTO: 0.01 K/UL (ref 0–0.51)
EOSINOPHIL NFR BLD: 0.1 % (ref 0–6.9)
ERYTHROCYTE [DISTWIDTH] IN BLOOD BY AUTOMATED COUNT: 41.7 FL (ref 35.9–50)
GLOBULIN SER CALC-MCNC: 2.5 G/DL (ref 1.9–3.5)
GLUCOSE SERPL-MCNC: 77 MG/DL (ref 65–99)
GLUCOSE UR STRIP.AUTO-MCNC: NEGATIVE MG/DL
HCT VFR BLD AUTO: 37.3 % (ref 37–47)
HGB BLD-MCNC: 12.6 G/DL (ref 12–16)
IMM GRANULOCYTES # BLD AUTO: 0.05 K/UL (ref 0–0.11)
IMM GRANULOCYTES NFR BLD AUTO: 0.3 % (ref 0–0.9)
KETONES UR STRIP.AUTO-MCNC: >=160 MG/DL
LEUKOCYTE ESTERASE UR QL STRIP.AUTO: NEGATIVE
LIPASE SERPL-CCNC: 15 U/L (ref 11–82)
LYMPHOCYTES # BLD AUTO: 2.19 K/UL (ref 1–4.8)
LYMPHOCYTES NFR BLD: 15.2 % (ref 22–41)
MCH RBC QN AUTO: 30.1 PG (ref 27–33)
MCHC RBC AUTO-ENTMCNC: 33.8 G/DL (ref 33.6–35)
MCV RBC AUTO: 89.2 FL (ref 81.4–97.8)
MICRO URNS: NORMAL
MONOCYTES # BLD AUTO: 0.91 K/UL (ref 0–0.85)
MONOCYTES NFR BLD AUTO: 6.3 % (ref 0–13.4)
NEUTROPHILS # BLD AUTO: 11.17 K/UL (ref 2–7.15)
NEUTROPHILS NFR BLD: 77.6 % (ref 44–72)
NITRITE UR QL STRIP.AUTO: NEGATIVE
NRBC # BLD AUTO: 0 K/UL
NRBC BLD-RTO: 0 /100 WBC
PH UR STRIP.AUTO: 5.5 [PH] (ref 5–8)
PLATELET # BLD AUTO: 329 K/UL (ref 164–446)
PMV BLD AUTO: 9.8 FL (ref 9–12.9)
POTASSIUM SERPL-SCNC: 3.3 MMOL/L (ref 3.6–5.5)
PROT SERPL-MCNC: 6.7 G/DL (ref 6–8.2)
PROT UR QL STRIP: NEGATIVE MG/DL
RBC # BLD AUTO: 4.18 M/UL (ref 4.2–5.4)
RBC UR QL AUTO: NEGATIVE
SODIUM SERPL-SCNC: 136 MMOL/L (ref 135–145)
SP GR UR STRIP.AUTO: 1.02
UROBILINOGEN UR STRIP.AUTO-MCNC: 0.2 MG/DL
WBC # BLD AUTO: 14.4 K/UL (ref 4.8–10.8)

## 2020-11-09 PROCEDURE — 700111 HCHG RX REV CODE 636 W/ 250 OVERRIDE (IP): Performed by: EMERGENCY MEDICINE

## 2020-11-09 PROCEDURE — A9270 NON-COVERED ITEM OR SERVICE: HCPCS | Performed by: EMERGENCY MEDICINE

## 2020-11-09 PROCEDURE — 700105 HCHG RX REV CODE 258: Performed by: EMERGENCY MEDICINE

## 2020-11-09 PROCEDURE — 700102 HCHG RX REV CODE 250 W/ 637 OVERRIDE(OP): Performed by: EMERGENCY MEDICINE

## 2020-11-09 PROCEDURE — 96375 TX/PRO/DX INJ NEW DRUG ADDON: CPT

## 2020-11-09 PROCEDURE — 81003 URINALYSIS AUTO W/O SCOPE: CPT

## 2020-11-09 PROCEDURE — 96374 THER/PROPH/DIAG INJ IV PUSH: CPT

## 2020-11-09 PROCEDURE — 96361 HYDRATE IV INFUSION ADD-ON: CPT

## 2020-11-09 RX ORDER — DOXYLAMINE SUCCINATE AND PYRIDOXINE HYDROCHLORIDE, DELAYED RELEASE TABLETS 10 MG/10 MG 10; 10 MG/1; MG/1
1 TABLET, DELAYED RELEASE ORAL 2 TIMES DAILY PRN
Qty: 14 TAB | Refills: 0 | Status: SHIPPED | OUTPATIENT
Start: 2020-11-09 | End: 2020-12-08

## 2020-11-09 RX ORDER — PYRIDOXINE HCL (VITAMIN B6) 25 MG
25 TABLET ORAL ONCE
Status: COMPLETED | OUTPATIENT
Start: 2020-11-09 | End: 2020-11-09

## 2020-11-09 RX ADMIN — SODIUM CHLORIDE 1000 ML: 9 INJECTION, SOLUTION INTRAVENOUS at 00:20

## 2020-11-09 RX ADMIN — Medication 25 MG: at 01:43

## 2020-11-09 RX ADMIN — LORAZEPAM 1 MG: 2 INJECTION INTRAMUSCULAR; INTRAVENOUS at 00:23

## 2020-11-09 RX ADMIN — METOCLOPRAMIDE 10 MG: 5 INJECTION, SOLUTION INTRAMUSCULAR; INTRAVENOUS at 00:22

## 2020-11-09 RX ADMIN — DIPHENHYDRAMINE HYDROCHLORIDE 25 MG: 50 INJECTION INTRAMUSCULAR; INTRAVENOUS at 00:21

## 2020-11-09 NOTE — ED TRIAGE NOTES
Nanci Spencer  24 y.o.  female  Chief Complaint   Patient presents with   • Epigastric Pain     c/o epigastric pain and vomiting x 2 hrs. patient was seen here yesterday and today for same. states that she is 7 weeks pregnant. also stated that had some yellow / soft stool today.

## 2020-11-09 NOTE — ED PROVIDER NOTES
ED Provider Note    ED Provider Note    Scribed for Alesha Taylor MD by Alesha Taylor M.D.. 11/8/2020, 11:40 PM.    Primary care provider: Pcp Pt States None  Means of arrival: Private  History obtained from: Patient  History limited by: None    CHIEF COMPLAINT  Chief Complaint   Patient presents with   • Epigastric Pain     c/o epigastric pain and vomiting x 2 hrs. patient was seen here yesterday and today for same. states that she is 7 weeks pregnant. also stated that had some yellow / soft stool today.       SORIN Spencer is a 24 y.o. G1, P0 female at 8 weeks gestation who presents to the Emergency Department for evaluation of nausea vomiting and epigastric discomfort.  Patient's been ill for approximately last 3 days.  She was seen in our facility yesterday morning and diagnosed with hyperemesis gravidarum.  Patient also has a history of marijuana use and is continuing to smoke marijuana.  Patient notes she had been feeling better after she was medicated in the ER but has been unable to tolerate oral intake throughout the day.  Patient's been using Zofran with no improvement.  Unable to tolerate even sips of fluid at this point so came to be reassessed.  Patient notes the pain is cramping and severe localized to the epigastrium without radiation.  No fever, no particular ill contacts, no dysuria, no chest discomfort, no cough.  Patient has had no vaginal bleeding and no change in vaginal discharge.  She will be following up with her gynecologist tomorrow.    REVIEW OF SYSTEMS  Pertinent positives include nausea, vomiting, epigastric pain. Pertinent negatives include no fever, no vaginal bleeding, no trauma.  All other systems reviewed and negative.    PAST MEDICAL HISTORY   Otherwise healthy    SURGICAL HISTORY  patient denies any surgical history    SOCIAL HISTORY  Social History     Tobacco Use   • Smoking status: Never Smoker   • Smokeless tobacco: Never Used   Substance Use Topics   •  "Alcohol use: Never     Frequency: Never   • Drug use: Yes     Comment: marijuana      Social History     Substance and Sexual Activity   Drug Use Yes    Comment: marijuana       FAMILY HISTORY  History reviewed. No pertinent family history.    CURRENT MEDICATIONS  Home Medications     Reviewed by Jean Marie Gonzalez R.N. (Registered Nurse) on 11/08/20 at 2058  Med List Status: Not Addressed   Medication Last Dose Status   doxylamine (UNISOM) 25 MG Tab tablet  Active   ondansetron (ZOFRAN ODT) 4 MG TABLET DISPERSIBLE  Active   QUEtiapine (SEROQUEL) 50 MG tablet  Active                ALLERGIES  No Known Allergies    PHYSICAL EXAM  VITAL SIGNS: /74   Pulse 84   Temp 36.3 °C (97.4 °F) (Temporal)   Resp (!) 24   Ht 1.676 m (5' 6\")   Wt 51.2 kg (112 lb 14 oz)   LMP 09/27/2020 (Approximate)   SpO2 98%   BMI 18.22 kg/m²     General: Alert, mild acute distress, appears anxious and very uncomfortable.  Skin: Warm, dry, normal for ethnicity  Head: Normocephalic, atraumatic  Neck: Trachea midline, no tenderness  Eye: PERRL, normal conjunctiva  ENMT: Oral mucosa pink and dry, no pharyngeal erythema or exudate  Cardiovascular: S1, S2, mildly tachycardic, otherwise regular rate and rhythm, No murmur, Normal peripheral perfusion  Respiratory: Lungs CTA, respirations are non-labored, breath sounds are equal  Gastrointestinal: Soft, bowel sounds are hypoactive.  Isolated tenderness the epigastrium, no lower quadrant tenderness, no guarding, no rebound, no rigidity.  Musculoskeletal: No swelling, no deformity  Neurological: Alert and oriented to person, place, time, and situation  Lymphatics: No lymphadenopathy  Psychiatric: Cooperative, patient appears anxious mood congruent affect      DIAGNOSTIC STUDIES/PROCEDURES    LABS  Results for orders placed or performed during the hospital encounter of 11/08/20   CBC WITH DIFFERENTIAL   Result Value Ref Range    WBC 14.4 (H) 4.8 - 10.8 K/uL    RBC 4.18 (L) 4.20 - 5.40 M/uL    " Hemoglobin 12.6 12.0 - 16.0 g/dL    Hematocrit 37.3 37.0 - 47.0 %    MCV 89.2 81.4 - 97.8 fL    MCH 30.1 27.0 - 33.0 pg    MCHC 33.8 33.6 - 35.0 g/dL    RDW 41.7 35.9 - 50.0 fL    Platelet Count 329 164 - 446 K/uL    MPV 9.8 9.0 - 12.9 fL    Neutrophils-Polys 77.60 (H) 44.00 - 72.00 %    Lymphocytes 15.20 (L) 22.00 - 41.00 %    Monocytes 6.30 0.00 - 13.40 %    Eosinophils 0.10 0.00 - 6.90 %    Basophils 0.50 0.00 - 1.80 %    Immature Granulocytes 0.30 0.00 - 0.90 %    Nucleated RBC 0.00 /100 WBC    Neutrophils (Absolute) 11.17 (H) 2.00 - 7.15 K/uL    Lymphs (Absolute) 2.19 1.00 - 4.80 K/uL    Monos (Absolute) 0.91 (H) 0.00 - 0.85 K/uL    Eos (Absolute) 0.01 0.00 - 0.51 K/uL    Baso (Absolute) 0.07 0.00 - 0.12 K/uL    Immature Granulocytes (abs) 0.05 0.00 - 0.11 K/uL    NRBC (Absolute) 0.00 K/uL   COMP METABOLIC PANEL   Result Value Ref Range    Sodium 136 135 - 145 mmol/L    Potassium 3.3 (L) 3.6 - 5.5 mmol/L    Chloride 103 96 - 112 mmol/L    Co2 16 (L) 20 - 33 mmol/L    Anion Gap 17.0 (H) 7.0 - 16.0    Glucose 77 65 - 99 mg/dL    Bun 9 8 - 22 mg/dL    Creatinine 0.59 0.50 - 1.40 mg/dL    Calcium 9.2 8.5 - 10.5 mg/dL    AST(SGOT) 23 12 - 45 U/L    ALT(SGPT) 18 2 - 50 U/L    Alkaline Phosphatase 56 30 - 99 U/L    Total Bilirubin 0.7 0.1 - 1.5 mg/dL    Albumin 4.2 3.2 - 4.9 g/dL    Total Protein 6.7 6.0 - 8.2 g/dL    Globulin 2.5 1.9 - 3.5 g/dL    A-G Ratio 1.7 g/dL   LIPASE   Result Value Ref Range    Lipase 15 11 - 82 U/L   URINALYSIS CULTURE, IF INDICATED    Specimen: Urine   Result Value Ref Range    Color Yellow     Character Clear     Specific Gravity 1.025 <1.035    Ph 5.5 5.0 - 8.0    Glucose Negative Negative mg/dL    Ketones >=160 Negative mg/dL    Protein Negative Negative mg/dL    Bilirubin Negative Negative    Urobilinogen, Urine 0.2 Negative    Nitrite Negative Negative    Leukocyte Esterase Negative Negative    Occult Blood Negative Negative    Micro Urine Req see below    HCG QUANTITATIVE SERUM  "  Result Value Ref Range    [facility] 742247.0 (H) 0.0 - 5.0 mIU/mL   ESTIMATED GFR   Result Value Ref Range    GFR If African American >60 >60 mL/min/1.73 m 2    GFR If Non African American >60 >60 mL/min/1.73 m 2     All labs reviewed by me, leukocytosis noted, improving from previous, likely demargination and hemoconcentrationconcentration.        RADIOLOGY  Reviewed unremarkable ultrasound from yesterday showing viable intrauterine pregnancy    The radiologist's interpretation of all radiological studies have been reviewed by me.    COURSE & MEDICAL DECISION MAKING  Pertinent Labs & Imaging studies reviewed. (See chart for details)    11:40 PM - Patient seen and examined at bedside. Patient will be treated with Reglan, Benadryl, 1 L of crystalloid. Ordered metabolic work-up including lipase and hCG quant to evaluate her symptoms. The differential diagnoses include but are not limited to: Hyperemesis gravidarum, cannabis hyperemesis syndrome, dehydration, electrolyte normality    0103: Patient reassessed, feeling much better, relieved here of unremarkable studies.  She had about 200 cc of fluid bolus thus far.  Labs are certainly consistent with dehydration, will continue to hydrate IV.  Will attempt p.o. challenge at this time.  I have ordered pyridoxine for her symptoms.    Patient Vitals for the past 24 hrs:   BP Temp Temp src Pulse Resp SpO2 Height Weight   11/09/20 0001 113/74 -- -- 84 (!) 24 98 % -- --   11/08/20 2305 114/90 -- -- (!) 101 (!) 24 99 % -- --   11/08/20 2055 -- -- -- -- -- -- 1.676 m (5' 6\") 51.2 kg (112 lb 14 oz)   11/08/20 2044 (!) 162/79 36.3 °C (97.4 °F) Temporal 100 20 96 % 1.676 m (5' 6\") --     HYDRATION: Based on the patient's presentation of Acute Vomiting, Dehydration and Inability to take oral fluids the patient was given IV fluids. IV Hydration was used because oral hydration was not as rapid as required. Upon recheck following hydration, the patient was Feeling better, heart rate " improving, currently 84.    Decision Making:  This is a 24 y.o. year old female who presents with nausea and vomiting for the last 3 days, patient unable to tolerate oral intake tonight so came back to be reassessed.  Patient was evaluated yesterday, diagnosed with hyperemesis gravidarum, she had been feeling better and was discharged with Zofran.  Patient obviously volume depleted and unable to tolerate p.o. acutely, clear location for IV fluid resuscitation.  Patient does have leukocytosis but no fever, no tachycardia, no hypotension, no evidence suggestive of septicemia.  Reviewed ultrasound is unremarkable dated yesterday, no indication to repeat her ultrasound.  Patient initially mildly hypertensive but this resolved without antihypertensive intervention.  LFTs were normal, no proteinuria, not consistent with preeclampsia.  Work-up thankfully is consistent with volume depletion but otherwise no evidence of acute kidney injury, no indication for inpatient management as such.    The patient will return for new or worsening symptoms and is stable at the time of discharge.    Patient has had high blood pressure while in the emergency department, felt likely secondary to medical condition. Counseled patient to monitor blood pressure at home and follow up with primary care physician.     DISPOSITION:  Patient will be discharged home in stable condition.    FOLLOW UP:  Pilar Castro M.D.  [address]   [address]  [facility] [address]  [phone]          Your OB/GYN    Schedule an appointment as soon as possible for a visit in 2 days        OUTPATIENT MEDICATIONS:  New Prescriptions    DOXYLAMINE-PYRIDOXINE (DICLEGIS) 10-10 MG TABLET DELAYED RESPONSE DELAYED-RELEASE TABLET    Take 1 Tab by mouth 2 times a day as needed (Morning sickness).         FINAL IMPRESSION  1. Nausea and vomiting in adult    2. Epigastric abdominal pain    3. First trimester pregnancy          I, Alesha Taylor M.D. (Doris), am juaning  for, and in the presence of, Alesha Taylor MD.    Electronically signed by: Alesha Taylor M.D. (Scribe), 11/8/2020    I, Alesha Taylor MD personally performed the services described in this documentation, as scribed by Alesha Taylor M.D. in my presence, and it is both accurate and complete    The note accurately reflects work and decisions made by me.  Alesha Taylor M.D.  11/9/2020  1:07 AM

## 2020-11-09 NOTE — ED NOTES
Pt discharged. Pt provided information about hyperemesis gravidarum. Pt educated to follow-up w/ OBGYN and to return to the hospital w/ any worsening symptoms. Pt walked to the [facility] and took an uber home.

## 2020-11-09 NOTE — ED NOTES
Agree w/ triage note. Pt walked back to room. Pt states she has been taking her prescribed medications but has not been able to keep anything down. Pt placed in gown and educated on ER process.

## 2020-11-09 NOTE — ED TRIAGE NOTES
Protocol not initiated. Patient had blood works today.   Pt is wondering if she can be seen sooner

## 2020-11-10 ENCOUNTER — TELEPHONE (OUTPATIENT)
Dept: SCHEDULING | Facility: IMAGING CENTER | Age: 25
End: 2020-11-10

## 2020-11-10 LAB
BACTERIA UR CULT: NORMAL
SIGNIFICANT IND 70042: NORMAL
SITE SITE: NORMAL
SOURCE SOURCE: NORMAL

## 2020-12-08 ENCOUNTER — OFFICE VISIT (OUTPATIENT)
Dept: MEDICAL GROUP | Facility: LAB | Age: 25
End: 2020-12-08
Payer: COMMERCIAL

## 2020-12-08 VITALS
HEART RATE: 85 BPM | WEIGHT: 110 LBS | SYSTOLIC BLOOD PRESSURE: 104 MMHG | HEIGHT: 66 IN | OXYGEN SATURATION: 99 % | RESPIRATION RATE: 16 BRPM | BODY MASS INDEX: 17.68 KG/M2 | DIASTOLIC BLOOD PRESSURE: 68 MMHG | TEMPERATURE: 98.9 F

## 2020-12-08 DIAGNOSIS — Z76.89 ENCOUNTER TO ESTABLISH CARE: ICD-10-CM

## 2020-12-08 DIAGNOSIS — Z30.09 BIRTH CONTROL COUNSELING: ICD-10-CM

## 2020-12-08 DIAGNOSIS — F41.9 ANXIETY: ICD-10-CM

## 2020-12-08 DIAGNOSIS — R63.6 UNDERWEIGHT: ICD-10-CM

## 2020-12-08 DIAGNOSIS — R63.0 DECREASED APPETITE: Primary | ICD-10-CM

## 2020-12-08 PROCEDURE — 99214 OFFICE O/P EST MOD 30 MIN: CPT | Performed by: PHYSICIAN ASSISTANT

## 2020-12-08 RX ORDER — HYDROXYZINE HYDROCHLORIDE 10 MG/1
10 TABLET, FILM COATED ORAL 3 TIMES DAILY PRN
COMMUNITY
End: 2021-05-06

## 2020-12-08 RX ORDER — CYPROHEPTADINE HYDROCHLORIDE 4 MG/1
4 TABLET ORAL 4 TIMES DAILY
Qty: 120 TAB | Refills: 0 | Status: SHIPPED | OUTPATIENT
Start: 2020-12-08 | End: 2020-12-17 | Stop reason: SDUPTHER

## 2020-12-08 RX ORDER — QUETIAPINE FUMARATE 100 MG/1
100 TABLET, FILM COATED ORAL
Qty: 90 TAB | Refills: 0
Start: 2020-12-08 | End: 2020-12-17 | Stop reason: SDUPTHER

## 2020-12-08 ASSESSMENT — ENCOUNTER SYMPTOMS
WEIGHT LOSS: 1
DEPRESSION: 1
MEMORY LOSS: 0
CARDIOVASCULAR NEGATIVE: 1
VOMITING: 1
HALLUCINATIONS: 0
DIAPHORESIS: 0
INSOMNIA: 0
FEVER: 0
RESPIRATORY NEGATIVE: 1
NAUSEA: 1
NERVOUS/ANXIOUS: 1
CHILLS: 0

## 2020-12-08 ASSESSMENT — LIFESTYLE VARIABLES: SUBSTANCE_ABUSE: 0

## 2020-12-08 ASSESSMENT — PATIENT HEALTH QUESTIONNAIRE - PHQ9
5. POOR APPETITE OR OVEREATING: 3 - NEARLY EVERY DAY
CLINICAL INTERPRETATION OF PHQ2 SCORE: 2
SUM OF ALL RESPONSES TO PHQ QUESTIONS 1-9: 6

## 2020-12-08 ASSESSMENT — FIBROSIS 4 INDEX: FIB4 SCORE: 0.38

## 2020-12-08 NOTE — ASSESSMENT & PLAN NOTE
New to me; pt would like to speak with OB/GYN regarding birth control options.   Recently had  at the end of 2020.   Doing fine afterward.   No pelvic pain.

## 2020-12-09 NOTE — ASSESSMENT & PLAN NOTE
New to me; chronic  Pt reports that she often has severe nausea related to chronic and ongoing anxiety.,   Has struggled to gain weight for most of her life, per patient.   She typically tries to drink ensure and Pedialyte    Has been using marijuana on and off to help with nausea and increase appetite which helps some. Denies any worsening nausea or cyclical vomiting with use of marijuana.   Pt reports that she has used periactin in the past with some success.

## 2020-12-09 NOTE — PROGRESS NOTES
Subjective:   Nanci Spencer is a 24 y.o. female here today for discussion about chronic anxiety, referral to GYN and decreased appetite.     New to me; has been seen by [facility] w/in the past 3 years.     Birth control counseling  New to me; pt would like to speak with OB/GYN regarding birth control options.   Recently had  at the end of 2020.   Doing fine afterward.   No pelvic pain.     Decreased appetite  New to me; chronic  Pt reports that she often has severe nausea related to chronic and ongoing anxiety.,   Has struggled to gain weight for most of her life, per patient.   She typically tries to drink ensure and Pedialyte    Has been using marijuana on and off to help with nausea and increase appetite which helps some. Denies any worsening nausea or cyclical vomiting with use of marijuana.   Pt reports that she has used periactin in the past with some success.        Current medicines (including changes today)  Current Outpatient Medications   Medication Sig Dispense Refill   • hydrOXYzine HCl (ATARAX) 10 MG Tab Take 10 mg by mouth 3 times a day as needed for Itching.     • QUEtiapine (SEROQUEL) 100 MG Tab Take 1 Tab by mouth every bedtime for 90 days. 90 Tab 0   • cyproheptadine (PERIACTIN) 4 MG Tab Take 1 Tab by mouth 4 times a day. Start with 1/2 tab PO 4x/day x1 week. Then increase to 1 tab PO 4x/day 120 Tab 0   • ondansetron (ZOFRAN ODT) 4 MG TABLET DISPERSIBLE Take 1 Tab by mouth every 6 hours as needed for Nausea. (Patient not taking: Reported on 2020) 20 Tab 0     No current facility-administered medications for this visit.      She  has no past medical history on file.    ROS   Review of Systems   Constitutional: Positive for malaise/fatigue and weight loss. Negative for chills, diaphoresis and fever.   Respiratory: Negative.    Cardiovascular: Negative.    Gastrointestinal: Positive for nausea and vomiting.   Skin: Negative for itching and rash.   Psychiatric/Behavioral: Positive  "for depression. Negative for hallucinations, memory loss, substance abuse and suicidal ideas. The patient is nervous/anxious. The patient does not have insomnia.           Objective:     /68 (BP Location: Left arm, Patient Position: Sitting, BP Cuff Size: Adult)   Pulse 85   Temp 37.2 °C (98.9 °F) (Temporal)   Resp 16   Ht 1.676 m (5' 6\")   Wt 49.9 kg (110 lb)   SpO2 99%  Body mass index is 17.75 kg/m².   Physical Exam:  Constitutional: Alert, no distress. Thin frame.  Skin: Warm, dry, good turgor, no rashes in visible areas.  Eye: Equal, round and reactive, conjunctiva clear, lids normal.  Neck: Trachea midline, no masses, no thyromegaly. No cervical or supraclavicular lymphadenopathy  Respiratory: Unlabored respiratory effort, lungs clear to auscultation, no wheezes, no ronchi.  Cardiovascular: Normal S1, S2, no murmur, no edema.  Psych: Alert and oriented x3, normal affect and mood.        Assessment and Plan:   The following treatment plan was discussed    1. Birth control counseling  New to me; pt is not currently using any birth control s/p .   She prefers to stay off of OCP.   Would like to see GYN for IUD placement.   Continue to recommend that if she chooses to have intercourse that she practice safe intercourse practices.   - REFERRAL TO OB/GYN    2. Anxiety  New to me; chronic   She is scheduled to see psychiatry on .   They will then take over Rx for Seroquel.  Continue to monitor symptoms, ED precautions if anxiety get acutely worse.   - QUEtiapine (SEROQUEL) 100 MG Tab; Take 1 Tab by mouth every bedtime for 90 days.  Dispense: 90 Tab; Refill: 0    3. Underweight  New to me; chronically underweight per patient.   She is requesting periactin to help stimulate her appetite.  Pt was educated on use and SEs of medication.  This Rx should also be taken over by psych once she establishes with them.   Pt was educated on use and SEs of medication.  Continue to monitor, follow up as " needed.   - cyproheptadine (PERIACTIN) 4 MG Tab; Take 1 Tab by mouth 4 times a day. Start with 1/2 tab PO 4x/day x1 week. Then increase to 1 tab PO 4x/day  Dispense: 120 Tab; Refill: 0    4. Decreased appetite  See above.   - cyproheptadine (PERIACTIN) 4 MG Tab; Take 1 Tab by mouth 4 times a day. Start with 1/2 tab PO 4x/day x1 week. Then increase to 1 tab PO 4x/day  Dispense: 120 Tab; Refill: 0    5. Encounter to establish care  New to me; has been seen by [facility] w/in the past 3 years.       Followup: Return if symptoms worsen or fail to improve.       ZULMA TitusA.-MARIELENA.  Supervising MD: Dr. Hayde Livingston MD  12/08/20

## 2020-12-17 ENCOUNTER — TELEMEDICINE (OUTPATIENT)
Dept: BEHAVIORAL HEALTH | Facility: CLINIC | Age: 25
End: 2020-12-17
Payer: COMMERCIAL

## 2020-12-17 DIAGNOSIS — F41.0 GENERALIZED ANXIETY DISORDER WITH PANIC ATTACKS: ICD-10-CM

## 2020-12-17 DIAGNOSIS — F41.9 ANXIETY: ICD-10-CM

## 2020-12-17 DIAGNOSIS — F41.1 GENERALIZED ANXIETY DISORDER WITH PANIC ATTACKS: ICD-10-CM

## 2020-12-17 DIAGNOSIS — F50.00 ANOREXIA NERVOSA: ICD-10-CM

## 2020-12-17 DIAGNOSIS — F31.81 BIPOLAR 2 DISORDER, MAJOR DEPRESSIVE EPISODE (HCC): ICD-10-CM

## 2020-12-17 DIAGNOSIS — R63.6 UNDERWEIGHT: ICD-10-CM

## 2020-12-17 DIAGNOSIS — R63.0 DECREASED APPETITE: ICD-10-CM

## 2020-12-17 PROCEDURE — 90791 PSYCH DIAGNOSTIC EVALUATION: CPT | Mod: 95,CR | Performed by: PSYCHIATRY & NEUROLOGY

## 2020-12-17 RX ORDER — MIRTAZAPINE 30 MG/1
TABLET, FILM COATED ORAL
Qty: 180 TAB | Refills: 0 | Status: SHIPPED | OUTPATIENT
Start: 2020-12-17 | End: 2021-02-17 | Stop reason: SDUPTHER

## 2020-12-17 RX ORDER — QUETIAPINE FUMARATE 100 MG/1
100 TABLET, FILM COATED ORAL
Qty: 90 TAB | Refills: 0 | Status: SHIPPED | OUTPATIENT
Start: 2020-12-17 | End: 2021-02-17 | Stop reason: SDUPTHER

## 2020-12-17 RX ORDER — CYPROHEPTADINE HYDROCHLORIDE 4 MG/1
4 TABLET ORAL 4 TIMES DAILY
Qty: 120 TAB | Refills: 2 | Status: SHIPPED | OUTPATIENT
Start: 2021-03-17 | End: 2021-02-17 | Stop reason: SDUPTHER

## 2020-12-17 NOTE — PROGRESS NOTES
[facility] INITIAL PSYCHIATRIC EVALUATION    This provider informed the patient their medical records are totally confidential except for the use by other providers involved in their care, or if the patient signs a release, or to report instances of child or elder abuse, or if it is determined they are an immediate risk to harm themselves or others.  To avoid spread of covid-19 virus this appointment was conducted by telehealth.  The patient gave consent to have this evaluztion by video telehealth.    Time at beginning of call:  09:00 AM    TOTAL FACE-TO-FACE TIME 30 minutes    CHIEF COMPLAINT       Having generalized anxiety, panic attacks, and loss of appetite.    IDENTIFYING INFORMATION       The patient is a single 23yo W female who is employed at a fragrance [employer] and lives with her boyfriend in an apartment in [place], [place].     HISTORY OF PRESENT ILLNESS       The patient was referred by her PC provider Ruby Muñoz PA-C because of Generalized Anxiety Disorder with Panic Attacks, Bipolar 2 Disorder, and decreased appetite and weight due to Anorexia Nervosa..  She has had generalized anxiety disorder for years characterized by excessive worry and concern that she cannot control, feeling on edge or restless, irritability, muscle tension in her neck, shoulders, and upper back, and difficulty relaxing or turning off her worries in order to sleep.  She has had panic attacks over the last 6 to 8 months 3 to 4 times a week with the sudden onset of SOB, palpitations, chest tightness, sweating, abdominal distress, nausea, tingling in her hands, dizziness, chills, hot flushes, and fear of loss of control.  She had a lot of anxiety as a child and was started on fluoxetine when she was 10yo.       She was diagnosed as having Bipolar 2 Disorder 2 1/2 years ago and was started on quetiapine.  She took lithium for a short while but didn't drink enough water so it was  discontinued.       She has energized episodes when she starts a lot of projects and wants to go a bunch of different places, she always has racing thoughts, and she had loss of need for sleep (sleeping 2 hours or less sleep/night X 2 to 3 nights in a row), impulsive spending, hypersexuality, she is extremely distractible, she feels euphoric.       She has persistent depressive disorder and chronic low grade depressed mood and feelings of inadequacy and low self-esteem, poor appetite, insomnia, low energy, fatigue, poor concentration, and feelings of hopelessness because of her chronic inability to gain weight.       She has a BMI of 17.75 and is 5 feet 6 inches and 110 pounds.  She denies binging, purging, and privation,  But she has body image distortion and feels fat and believes her stomach is a little bulging, and she get embarassed about weighing too much weight.  However, she has had intractable nausea, abdominal pain, and recurrent bouts of watery diarrhea.  She had a pregnancy in July 2020 and had hyperemesis gravidarum.  She terminated the pregnancy at [facility] on 09/21/2020.         She regularly smokes marijuana to partially stimulate appetite.  She has no apparent physical cause of decreased appetite, no need to break away from a restrictive family environment, and no abuse of laxatives. She exercises 30 minutes a day to a video.    PSYCHIATRIC REVIEW OF SYSTEMS  She denies mood swings, major depression, psychosis, OCD, or PTSD.  See HPI for persistent depressive disorder, generalized anxiety, and panic attacks.    MEDICAL REVIEW OF SYSTEMS:   Constitutional positive - low body weight, BMI = 17.75   Eyes negative   Ears/Nose/Mouth/Throat negative   Cardiovascular negative   Respiratory negative   Gastrointestinal positive - epigastric pain   Genitourinary positive - amenorrhea   Muscular negative   Integumentary negative   Neurological negative   Endocrine negative   Hematologic/Lymphatic  negative       PAST PSYCHIATRIC HISTORY       Persistent Depressive Disorder, Generalized Anxiety Disorder with Panic Attacks, and Unspecified Eating Disorder.  PAST PSYCHIATRIC MEDICATIONS       Quetiapine, clonazepam, cyproheptadine, lithium   SOCIAL HISTORY       The patient was born and raised in [place], [place] and denies having any childhood abuse.  She graduated high school.  DRUGS smokes marijuana daily for appetite and it helps with anxiety.   ALCOHOL none  TOBACCO none    MEDICAL HISTORY       Low body weight, chronic epigastric pain, and amenorrhea.  CURRENT MEDICATIONS       Starting       Quetiapine 100mg po QHS as a mood stabilizer.       Cyproheptadine 4mg po QID.       Hydroxyzine 10mg po TID as needed for anxiety.       Odansetron 4mg po Q6H as needed for nausea.  ALLERGIES       None  MENTAL STATUS EXAMINATION    LMP 09/27/2020 (Approximate)   Participation: Active verbal participation  Grooming:Casual  Orientation: Fully Oriented  Eye contact: Good  Behavior:Tense   Mood: Depressed  Affect: Constricted  Thought process: Logical  Thought content:  Within normal limits  Speech: Rate within normal limits and Volume within normal limits  Perception:  Within normal limits  Memory:  No gross evidence of memory deficits  Insight: Adequate  Judgment: Adequate  Family/couple interaction observations:   Other:  Depression Screen (PHQ-2/PHQ-9) 12/8/2020   PHQ-2 Total Score 2   PHQ-9 Total Score 6       Interpretation of PHQ-9 Total Score   Score Severity   1-4 No Depression   5-9 Mild Depression   10-14 Moderate Depression   15-19 Moderately Severe Depression   20-27 Severe Depression  CURRENT RISK       Suicidal:Low       Homicidal:Not applicable       Self-Harm:Low       Relapse:Moderate       Crisis Safety Plan Reviewed No    ASSESSMENT       Having persistent depressive disorder, generalized anxiety, and panic attacks coupled with weight loss.  She will bwe started on mirtazapine 30mg po QHS X 1 week, then  increased to 45mg po QHS X 1 week, then increase to 60mg po QHS thereafter for appetite stimulation and sleep.  She will be continued on quetiapine 100mg po QHS for appetite and impulsivity.           DIFFERENTIAL DIAGNOSES       (1) Persistent Depressive Disorder (F34.1)       (2) Generalized Anxiety Disorder with Panic Attacks (F41.1)       (3) Unspecified Eating Disorder (F50.9)  PLAN       Start mirtazapine 30mg po QHS X 1 week, then increase to 45mg po QHS X 1 week, then increase to 60mg po QHS thereafter.       Continue quetiapine 100po QHS.       Continue cyproheptadine 4mg po QID.       RTC to  Clinic in 2 months for 30 min follow up with this provider.    Patient was seen for 60 minutes face to face of which > 50% of appointment time was spent on counseling and coordination of care regarding the above.

## 2021-02-16 ENCOUNTER — HOSPITAL ENCOUNTER (OUTPATIENT)
Facility: MEDICAL CENTER | Age: 26
End: 2021-02-16
Attending: OBSTETRICS & GYNECOLOGY
Payer: COMMERCIAL

## 2021-02-16 ENCOUNTER — GYNECOLOGY VISIT (OUTPATIENT)
Dept: OBGYN | Facility: CLINIC | Age: 26
End: 2021-02-16
Payer: COMMERCIAL

## 2021-02-16 VITALS
SYSTOLIC BLOOD PRESSURE: 112 MMHG | DIASTOLIC BLOOD PRESSURE: 72 MMHG | WEIGHT: 123 LBS | BODY MASS INDEX: 19.3 KG/M2 | HEIGHT: 67 IN

## 2021-02-16 DIAGNOSIS — Z30.430 ENCOUNTER FOR INSERTION OF MIRENA IUD: ICD-10-CM

## 2021-02-16 DIAGNOSIS — Z30.09 FAMILY PLANNING EDUCATION, GUIDANCE, AND COUNSELING: ICD-10-CM

## 2021-02-16 PROCEDURE — 87491 CHLMYD TRACH DNA AMP PROBE: CPT

## 2021-02-16 PROCEDURE — 58300 INSERT INTRAUTERINE DEVICE: CPT | Performed by: OBSTETRICS & GYNECOLOGY

## 2021-02-16 PROCEDURE — 87591 N.GONORRHOEAE DNA AMP PROB: CPT

## 2021-02-16 PROCEDURE — 99385 PREV VISIT NEW AGE 18-39: CPT | Mod: 25 | Performed by: OBSTETRICS & GYNECOLOGY

## 2021-02-16 ASSESSMENT — FIBROSIS 4 INDEX: FIB4 SCORE: 0.4

## 2021-02-16 NOTE — NON-PROVIDER
Pt is here to establish care.  Pt states she would like to discuss IUD options. No other concerns. Currently on the pill  Good# [phone]  Pharmacy verified

## 2021-02-16 NOTE — PROGRESS NOTES
"Nanci Spencer25 y.o.  female presents for Annual Well Woman Exam.    ROS: Patient is feeling well. No dyspnea or chest pain on exertion. No Abdominal pain, change in bowel habits, black or bloody stools. No urinary sx. GYN ROS:normal menses, no abnormal bleeding, pelvic pain or discharge, no breast pain or new or enlarging lumps on self exam. Denies breast tenderness, mass, discharge, changes in size or contour, or abnormal cyclic discomfort., Negative breast symptoms: tenderness, pain, mass, cyst, discharge, skin changes No neurological complaints.  Past Medical History:   Diagnosis Date   • Anxiety      None  Allergy:   Patient has no known allergies.    LMP:       Patient's last menstrual period was 2021 (exact date). .     Menstrual History: menses regular every 28  days  Contraceptive Method:none      Objective : The patient appears well, alert and oriented x 3, in no acute distress.  /72   Ht 1.702 m (5' 7\")   Wt 55.8 kg (123 lb)   HEENT Exam: EOMI, MICHELLE, no adenopathy or thyromegaly.  Lungs: Clear to Auscultation and Percussion.  Cor: S1 and S2 normal, no murmurs, or rubs   Abdomen: Soft without tenderness, guarding mass or organomegaly.  Extremities: No edema, pulses equal  Neurological: Normal No focal signs  Breast Exam:Inspection negative. No nipple discharge or bleeding. No masses or nodularity palpable, negative findings: normal in size and symmetry, normal contour with no evidence of flattening or dimpling, skin normal, nipples everted without rashes or discharge  Pelvic: External genitalia,urethral meatus, urethra, bladder and vagina normal. Cervix, uterus and adnexa intact and normal.  Anus and perineum normal. Bimanual and rectovaginal without masses or tenderness.    Lab:No results found for this or any previous visit (from the past 336 hour(s)).    Assessment:  well woman  Desires switch to LARC method for BCM   Discuss risks , benefits and alternatives to Long Acting Reversible " Contraceptives as a group , as well as IUD's in particular.   Risk of infection, Pelvic inflammatory disease, abnormal bleeding , or pregnancy with ectopic location as well considered. Patient is aware of nexplanon , and its LARC features and need for both surgical insertion and removal.     Desires Mirena        UPT negative  Plan:return annually or prn  cultures  Contraception: and IUD

## 2021-02-17 ENCOUNTER — TELEMEDICINE (OUTPATIENT)
Dept: BEHAVIORAL HEALTH | Facility: CLINIC | Age: 26
End: 2021-02-17
Payer: COMMERCIAL

## 2021-02-17 VITALS — WEIGHT: 120 LBS | HEIGHT: 67 IN | BODY MASS INDEX: 18.83 KG/M2

## 2021-02-17 DIAGNOSIS — F41.1 GENERALIZED ANXIETY DISORDER WITH PANIC ATTACKS: ICD-10-CM

## 2021-02-17 DIAGNOSIS — R63.0 DECREASED APPETITE: ICD-10-CM

## 2021-02-17 DIAGNOSIS — Z30.430 ENCOUNTER FOR INSERTION OF MIRENA IUD: ICD-10-CM

## 2021-02-17 DIAGNOSIS — F31.81 BIPOLAR 2 DISORDER, MAJOR DEPRESSIVE EPISODE (HCC): ICD-10-CM

## 2021-02-17 DIAGNOSIS — F41.0 GENERALIZED ANXIETY DISORDER WITH PANIC ATTACKS: ICD-10-CM

## 2021-02-17 DIAGNOSIS — F41.9 ANXIETY: ICD-10-CM

## 2021-02-17 DIAGNOSIS — F50.00 ANOREXIA NERVOSA: ICD-10-CM

## 2021-02-17 DIAGNOSIS — R63.6 UNDERWEIGHT: ICD-10-CM

## 2021-02-17 DIAGNOSIS — Z30.09 FAMILY PLANNING EDUCATION, GUIDANCE, AND COUNSELING: ICD-10-CM

## 2021-02-17 LAB
AMBIGUOUS DTTM AMBI4: NORMAL
C TRACH DNA SPEC QL NAA+PROBE: NEGATIVE
N GONORRHOEA DNA SPEC QL NAA+PROBE: NEGATIVE
SIGNIFICANT IND 70042: NORMAL
SITE SITE: NORMAL
SOURCE SOURCE: NORMAL
SPECIMEN SOURCE: NORMAL

## 2021-02-17 PROCEDURE — 90833 PSYTX W PT W E/M 30 MIN: CPT | Mod: 95,CR | Performed by: PSYCHIATRY & NEUROLOGY

## 2021-02-17 PROCEDURE — 99213 OFFICE O/P EST LOW 20 MIN: CPT | Mod: 95,CR | Performed by: PSYCHIATRY & NEUROLOGY

## 2021-02-17 RX ORDER — BUPROPION HYDROCHLORIDE 150 MG/1
150 TABLET ORAL EVERY MORNING
Qty: 90 TABLET | Refills: 0 | Status: SHIPPED | OUTPATIENT
Start: 2021-02-17 | End: 2021-05-06 | Stop reason: SDUPTHER

## 2021-02-17 RX ORDER — MIRTAZAPINE 30 MG/1
TABLET, FILM COATED ORAL
Qty: 180 TABLET | Refills: 0 | Status: SHIPPED | OUTPATIENT
Start: 2021-02-17 | End: 2021-05-06 | Stop reason: SDUPTHER

## 2021-02-17 RX ORDER — CYPROHEPTADINE HYDROCHLORIDE 4 MG/1
4 TABLET ORAL 4 TIMES DAILY
Qty: 120 TABLET | Refills: 2 | Status: SHIPPED | OUTPATIENT
Start: 2021-03-17 | End: 2021-04-16

## 2021-02-17 RX ORDER — QUETIAPINE FUMARATE 100 MG/1
100 TABLET, FILM COATED ORAL
Qty: 90 TABLET | Refills: 0 | Status: SHIPPED | OUTPATIENT
Start: 2021-02-17 | End: 2021-05-06 | Stop reason: SDUPTHER

## 2021-02-17 ASSESSMENT — FIBROSIS 4 INDEX: FIB4 SCORE: 0.4

## 2021-02-17 NOTE — PROGRESS NOTES
[facility] PSYCHIATRIC FOLLOW-UP NOTE    This provider informed the patient their medical records are totally confidential except for the use by other providers involved in their care, or if the patient signs a release, or to report instances of child or elder abuse, or if it is determined they are an immediate risk to harm themselves or others.  To avoid spread of covid-19 virus this appointment was conducted by telehealth.  The patient gave consent to have this follow up session by video telehealth.    Time at beginning of call:  04:00 PM    TOTAL FACE-TO-FACE TIME  30 minutes    CHIEF COMPLAINT       Having mood swings, generalized anxiety, panic attacks, and eating disorder (anorexia).  HISTORY OF PRESENT ILLNESS       The patient was referred by her PC provider Ruby Muñoz PA-C because of Generalized Anxiety Disorder with Panic Attacks, Bipolar 2 Disorder, and decreased appetite and weight due to Anorexia Nervosa..  She has had generalized anxiety disorder for years characterized by excessive worry and concern that she cannot control, feeling on edge or restless, irritability, muscle tension in her neck, shoulders, and upper back, and difficulty relaxing or turning off her worries in order to sleep.  She has had panic attacks over the last 6 to 8 months 3 to 4 times a week with the sudden onset of SOB, palpitations, chest tightness, sweating, abdominal distress, nausea, tingling in her hands, dizziness, chills, hot flushes, and fear of loss of control.  She had a lot of anxiety as a child and was started on fluoxetine when she was 10yo.       She was diagnosed as having Bipolar 2 Disorder 2 1/2 years ago and was started on quetiapine.  She took lithium for a short while but didn't drink enough water so it was discontinued.       She has energized episodes when she starts a lot of projects and wants to go a bunch of different places, she always has racing  thoughts, and she had loss of need for sleep (sleeping 2 hours or less sleep/night X 2 to 3 nights in a row), impulsive spending, hypersexuality, she is extremely distractible, she feels euphoric.       She has persistent depressive disorder and chronic low grade depressed mood and feelings of inadequacy and low self-esteem, poor appetite, insomnia, low energy, fatigue, poor concentration, and feelings of hopelessness because of her chronic inability to gain weight.  She also has premenstrual dysphoric disorder in the week before her menses.       She has a BMI of 17.75 and is 5 feet 6 inches and 110 pounds.  She denies binging, purging, and privation,  But she has body image distortion and feels fat and believes her stomach is a little bulging, and she get embarassed about weighing too much weight.  However, she has had intractable nausea, abdominal pain, and recurrent bouts of watery diarrhea.  She had a pregnancy in July 2020 and had hyperemesis gravidarum.  She [facility] the pregnancy at [facility] on 09/21/2020.         She has gained 10 pounds and she has a lot of support from her boyfriend.       She regularly smokes marijuana to partially stimulate appetite.  She has no apparent physical cause of decreased appetite, no need to break away from a restrictive family environment, and no abuse of laxatives. She exercises 30 minutes a day to a video.  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  RESPONSE TO TREATMENT       Having better alleviation of anxiety and panic attacks by mirtazapine 60mg po QHS.  PAST PSYCHIATRIC MEDICATIONS       Quetiapine, bupropion, clonazepam, cyproheptadine, lithium, mirtazapine  MEDICATION SIDE EFFECTS       None.    MEDICAL REVIEW OF SYSTEMS:   Constitutional positive - low body weight, BMI = 17.75   Eyes negative   Ears/Nose/Mouth/Throat negative   Cardiovascular negative   Respiratory  negative   Gastrointestinal positive - epigastric pain   Genitourinary positive - amenorrhea   Muscular negative   Integumentary negative   Neurological negative   Endocrine negative   Hematologic/Lymphatic negative        MENTAL STATUS EVALUATION  LMP 02/12/2021 (Exact Date)   Participation: Active verbal participation  Grooming:Neat  Orientation: Fully Oriented  Eye contact: Good  Behavior:Calm   Mood: Depressed  Affect: Full range  Thought process: Logical  Thought content:  Within normal limits  Speech: Rate within normal limits and Volume within normal limits  Perception:  Within normal limits  Memory:  No gross evidence of memory deficits  Insight: Adequate  Judgment: Adequate  Family/couple interaction observations:   Other:  Depression Screen (PHQ-2/PHQ-9) 12/8/2020   PHQ-2 Total Score 2   PHQ-9 Total Score 6       Interpretation of PHQ-9 Total Score   Score Severity   1-4 No Depression   5-9 Mild Depression   10-14 Moderate Depression   15-19 Moderately Severe Depression   20-27 Severe Depression  Current risk:    Suicide: Low   Homicide: Not applicable   Self-harm: Low  Relapse: Moderate  Other:   Crisis Safety Plan reviewed?No  If evidence of imminent risk is present, intervention/plan:    Medical Records/Labs/Diagnostic Tests Reviewed: yes    Medical Records/Labs/Diagnostic Tests Ordered: no    DIAGNOSTIC IMPRESSIONS       (1) Bipolar 2 Disorder, Depressed (F31.81)       (2) Generalized Anxiety Disorder with Panic Attacks (F41.1)       (3) Anorexia Nervosa (F50.01)    ASSESSMENT AND PLAN       Having too little energy and motivation.  She will be started on bupropion XL 150mg po daily.       Start bupropion XL 150mg po daily.       Continue mirtazapine 60mg po QHS.       Continue quetiapine 100po QHS.       Continue cyproheptadine 4mg po QID (for appetite)       RTC to  Clinic in 2 months for 30 min follow up with this provider.    Time at end of call:  04:30 PM    30 minutes spent in  psychotherapy  Topics addressed in psychotherapy include:  Explored whether she has any body image distortions.  How to recognize prodromal symptoms of a hypomanic episode.  Discussed how smoking marijuana daily can diminish motivation.

## 2021-02-17 NOTE — PROCEDURES
IUD Insertion    Date/Time: 2/16/2021 4:35 PM  Performed by: Paul Nicholson M.D.  Authorized by: Paul Nicholson M.D.     Consent:     Consent obtained:  Written    Consent given by:  Patient    Procedure risks and benefits discussed: yes      Patient questions answered: yes      Patient agrees, verbalizes understanding, and wants to proceed: yes      Educational handouts given: yes      Instructions and paperwork completed: yes    Pre-procedure details:     Negative GC/chlamydia test: yes      Negative urine pregnancy test: yes      Negative serum pregnancy test: no    Procedure:     Pelvic exam performed: yes      Sterile speculum placed in vagina: yes      Cervix visualized: yes      Cervix cleaned and prepped in sterile fashion: yes      Tenaculum applied to cervix: yes      Dilation needed: yes (small sliver dilator)      Uterus sounded: yes      Uterus sound depth (cm):  7.5    IUD inserted with no complications: yes      IUD type:  Mirena    Strings trimmed: yes    Post-procedure:     Patient tolerated procedure well: yes      Patient will follow up after next period: yes

## 2021-03-10 ENCOUNTER — TELEMEDICINE (OUTPATIENT)
Dept: BEHAVIORAL HEALTH | Facility: CLINIC | Age: 26
End: 2021-03-10
Payer: COMMERCIAL

## 2021-03-10 DIAGNOSIS — F31.81 BIPOLAR 2 DISORDER, MAJOR DEPRESSIVE EPISODE (HCC): ICD-10-CM

## 2021-03-10 DIAGNOSIS — Z71.89 GRIEF COUNSELING: ICD-10-CM

## 2021-03-10 DIAGNOSIS — F50.00 ANOREXIA NERVOSA: ICD-10-CM

## 2021-03-10 DIAGNOSIS — F41.0 GENERALIZED ANXIETY DISORDER WITH PANIC ATTACKS: ICD-10-CM

## 2021-03-10 DIAGNOSIS — F41.1 GENERALIZED ANXIETY DISORDER WITH PANIC ATTACKS: ICD-10-CM

## 2021-03-10 PROCEDURE — 90791 PSYCH DIAGNOSTIC EVALUATION: CPT | Performed by: MARRIAGE & FAMILY THERAPIST

## 2021-03-10 NOTE — BH THERAPY
[facility]  INITIAL ASSESSMENT    This evaluation was conducted via Zoom using secure and encrypted videoconferencing technology. The patient was in a private location in the state [facility].    The patient's identity was confirmed and verbal consent was obtained for this virtual visit.     Name: Nanci Spencer  MRN: [id number]  : 1995  Age: 25 y.o.  Date of assessment: 3/10/2021  PCP: Ruby Muñoz P.A.-C.  Persons in attendance: Patient  Total session time: 45 minutes      CHIEF COMPLAINT AND HISTORY OF PRESENTING PROBLEM:  (as stated by Patient):  Nanci Spencer is a 25 y.o., White female referred for assessment by No ref. provider found.  Primary presenting issue includes   Chief Complaint   Patient presents with   • Depression   • Anxiety   • Initial  Evaluation       FAMILY/SOCIAL HISTORY  Current living situation/household members: bf Neno  Relevant family history/structure/dynamics: parents still together, 2 sisters and one brother  Current family/social stressors: currently  and coping  Quality/quantity of current family and/or social support: good  Does patient/parent report a family history of behavioral health issues, diagnoses, or treatment? Counseling in the past  Family History   Problem Relation Age of Onset   • Anxiety disorder Mother    • No Known Problems Father    • No Known Problems Sister    • No Known Problems Sister         BEHAVIORAL HEALTH TREATMENT HISTORY  Does patient/parent report a history of prior behavioral health treatment for patient? NA    History of untreated behavioral health issues identified? No    MEDICAL HISTORY  Primary care behavioral health screenings:    Depression Screen (PHQ-2/PHQ-9) 2020   PHQ-2 Total Score 2   PHQ-9 Total Score 6       Interpretation of PHQ-9 Total Score   Score Severity   1-4 No Depression   5-9 Mild Depression   10-14 Moderate Depression   15-19 Moderately Severe Depression   20-27 Severe Depression     No  flowsheet data found.    Interpretation of PUSHPA 7 Total Score   Score Severity:  0-4 No Anxiety   5-9 Mild Anxiety  10-14 Moderate Anxiety  15-21 Severe Anxiety     RESULTS OF SCREENING MEASURES:    [] Not applicable  Measure: PHQ9 Score:  Follow up  Measure: PUSHPA-7 Score:   Measure: PTSD Score:  Measure: DAST Score:  Measure: AUDIT Score:     Past medical/surgical history:   Past Medical History:   Diagnosis Date   • Anxiety       No past surgical history on file.     Medication Allergies:  Patient has no known allergies.   Medical history provided by patient during current evaluation:yes    Patient reports last physical exam: month ago/initial appt pcp  Does patient/parent report any history of or current developmental concerns? No  Does patient/parent report nutritional concerns? mindful of eating healthy  Does patient/parent report change in appetite or weight loss/gain? struggle with appetite all the way in [employer] and gaining weight  Does patient/parent report history of eating disorder symptoms? No  Does patient/parent report dental problem? No  Does patient/parent report physical pain? No but IUD has been causing cramps   Indicate if pain is acute or chronic, and location: NA   Pain scale rating:  NA     Does patient/parent report functional impact of medical, developmental, or pain issues?   N\A    EDUCATIONAL/LEARNING HISTORY  Is patient currently enrolled in a school/educational program?   H.S., started college and drop out a few times/computer science    EMPLOYMENT/RESOURCES  Is the patient currently employed? [email or web address]  Does the patient/parent report adequate financial resources? No  Does patient identify impact of presenting issue on work functioning? No  Work or income-related stressors:  NA     HISTORY:  None    SPIRITUAL/CULTURAL/IDENTITY:  What are the patient’s/family’s spiritual beliefs or practices? Mom   Mandaeism and Dad did not practice  What is the patient’s cultural or ethnic  "background/identity?   How does the patient identify their sexual orientation? straight  How does the patient identify their gender? female  Does the patient identify any spiritual/cultural/identity factors as relevant to the presenting issue? NA    LEGAL HISTORY  None      ABUSE/NEGLECT/TRAUMA SCREENING  Does patient report feeling “unsafe” in his/her home, or afraid of anyone? No  Does patient report any history of physical, sexual, or emotional abuse? No  Does parent or significant other report any of the above? No  Is there evidence of neglect by self? No  Is there evidence of neglect by a caregiver? No  Does the patient/parent report any history of CPS/APS/police involvement related to suspected abuse/neglect or domestic violence? No  Does the patient/parent report any other history of potentially traumatic life events? 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  NA     SAFETY ASSESSMENT - SELF  Does patient acknowledge current or past symptoms of dangerousness to self? \"past in [employer]  Does parent/significant other report patient has current or past symptoms of dangerousness to self? No        Current Suicide Risk: Not applicable  Crisis Safety Plan completed and copy given to patient: No    SAFETY ASSESSMENT - OTHERS  None    SUBSTANCE USE/ADDICTION HISTORY  [] Not applicable - patient 10 years of age or younger    Is there a family history of substance use/addiction? Mom recovering addict/pain related issues/zone out/napping  Does patient acknowledge or parent/significant other report use of/dependence on substances? No  Last time patient used alcohol: used to drink a lot/time in rehab about 2 1/2 years  Within the past week? No  Last time patient used marijuana: 1x/2x a day  Within the past month? Yes  Any other street drugs ever " "tried even once? Yes  Any use of prescription medications/pills without a prescription, or for reasons others than originally prescribed?  No  Any other addictive behavior reported (gambling, shopping, sex)? No     Drug History:    Amphetamine: NA      Cannibis: everyday/open to tapering off      Cocaine: NA      Ecstasy: NA      Hallucinogen: NA      Inhalant: NA      Opiate: NA      Other:      Sedative: NA      What consequences does the patient associate with any of the above substance use and or addictive behaviors? None    Patient’s motivation/readiness for change: NA    [] Patient denies use of any substance/addictive behaviors    STRENGTHS/ASSETS  Strengths Identified by interviewer: Insight into problems  Strengths Identified by patient: \"baking, very caring, empathy.\"    MENTAL STATUS/OBSERVATIONS   Participation: Active verbal participation and Open to feedback  Grooming: Casual  Orientation:Fully Oriented   Behavior: Calm  Eye contact: Good   Mood:Depressed and Anxious  Affect:Full range and Congruent with content  Thought process: Logical  Thought content:  Within normal limits  Speech: Rate within normal limits  Perception: Within normal limits  Memory: No gross evidence of memory deficits  Insight: Good  Judgment:  Good  Other:    Family/couple interaction observations: NA      CLINICAL FORMULATION:    Ms. Christine reports got pregnant and recently chose not to have the baby. Open to grief and loss therapy.    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 She also is open to tapering down cannabis used for healthy benefits and related anxiety/depressiove issues.    Pt receptive to the 8 dimensions of wellness, professional development and stress management skills.     DIAGNOSTIC IMPRESSION(S):  1. Anorexia nervosa    2. Bipolar 2 disorder, major depressive episode " (HCC)    3. Generalized anxiety disorder with panic attacks          IDENTIFIED NEEDS/PLAN:  [If any of these marked, trigger DISPOSITION list]  Mood/anxiety  NA    Does patient express agreement with the above plan? Yes     Referral appointment(s) scheduled? Pt was provided scheduling dept contact for next appt       RONALDO Khalil.

## 2021-03-29 ENCOUNTER — GYNECOLOGY VISIT (OUTPATIENT)
Dept: OBGYN | Facility: CLINIC | Age: 26
End: 2021-03-29
Payer: COMMERCIAL

## 2021-03-29 DIAGNOSIS — Z30.431 IUD CHECK UP: ICD-10-CM

## 2021-03-29 PROCEDURE — 99212 OFFICE O/P EST SF 10 MIN: CPT | Performed by: OBSTETRICS & GYNECOLOGY

## 2021-03-29 NOTE — PROGRESS NOTES
Chief complaint: IUD check    History of present illness: 25 y.o.  1 para 0-0-1-0 presents with above chief complaint. Pt had Mirena IUD placed without any complications and presents for an IUD check up. She reports minimal spotting/bleeding, no pain, occasional discomfort with intercourse, no discharge. Denies fever, chills, nausea, vomiting. Overall very happy with device.    Review of systems:  Pertinent positives documented in HPI and all other systems reviewed & are negative      All PMH, PSH, allergies, social history and FH reviewed and updated today:  Past Medical History:   Diagnosis Date   • Anxiety        History reviewed. No pertinent surgical history.    Allergies: No Known Allergies    Social History     Socioeconomic History   • Marital status: Single     Spouse name: Not on file   • Number of children: Not on file   • Years of education: Not on file   • Highest education level: Not on file   Occupational History   • Not on file   Tobacco Use   • Smoking status: Former Smoker     Types: Cigarettes   • Smokeless tobacco: Never Used   Substance and Sexual Activity   • Alcohol use: Yes     Comment: social   • Drug use: Yes     Types: Marijuana     Comment: marijuana   • Sexual activity: Yes     Partners: Male     Birth control/protection: Pill   Other Topics Concern   • Not on file   Social History Narrative    Lives with her boyfriend.     No pets.              Social Determinants of Health     Financial Resource Strain:    • Difficulty of Paying Living Expenses:    Food Insecurity:    • Worried About Running Out of Food in the Last Year:    • Ran Out of Food in the Last Year:    Transportation Needs:    • Lack of Transportation (Medical):    • Lack of Transportation (Non-Medical):    Physical Activity:    • Days of Exercise per Week:    • Minutes of Exercise per Session:    Stress:    • Feeling of Stress :    Social Connections:    • Frequency of Communication with Friends and Family:    •  Frequency of Social Gatherings with Friends and Family:    • Attends [employer] Services:    • Active Member of Clubs or Organizations:    • Attends Club or Organization Meetings:    • Marital Status:    Intimate Partner Violence:    • Fear of Current or Ex-Partner:    • Emotionally Abused:    • Physically Abused:    • Sexually Abused:        Family History   Problem Relation Age of Onset   • Anxiety disorder Mother    • No Known Problems Father    • No Known Problems Sister    • No Known Problems Sister        Physical exam:  There were no vitals taken for this visit.    GENERAL APPEARANCE: healthy, alert, no distress, cooperative, smiling  NECK nontender, no masses, thyromegaly or nodules  ABDOMEN Abdomen soft, non-tender. BS normal. No masses,  No organomegaly  FEMALE GYN: normal female external genitalia without lesions, no vaginal discharge noted, vulva pink without erythema or friability, urethra is normal without discharge or scarring, no bladder fullness or masses, normal vagina and normal vaginal tone, normal cervix, normal  uterus, size and consistency, IUD strings seen and palpated with normal length of strings, normal anus and perineum.  EXTREMITIES:negative clubbing, cyanosis, edema    NEURO Awake, alert and oriented x 3, Normal gait, no sensory deficits  SKIN No rashes, or ulcers or lesions seen  PSYCHIATRIC: Patient shows appropriate affect, is alert and oriented x3, intact judgment and insight.      1. IUD check up       IUD in appropriate location    All questions answered    Pap smear up-to-date.  Due in November 2023    Spent 15 minutes in face-to-face patient contact in which greater than 50% of that visit was spent in counseling/coordination of care of IUD and normal menstrual irregularity side effects. Reminded patient to check for strings monthly and to call the office if IUD has fallen out or any other problems should arise. Also reminded patient IUD expires in 5 years.  Follow up yearly for  annual exam or sooner as needed.

## 2021-05-05 DIAGNOSIS — R23.9 SKIN CHANGE: ICD-10-CM

## 2021-05-06 ENCOUNTER — TELEMEDICINE (OUTPATIENT)
Dept: BEHAVIORAL HEALTH | Facility: CLINIC | Age: 26
End: 2021-05-06
Payer: COMMERCIAL

## 2021-05-06 VITALS — WEIGHT: 120 LBS | HEIGHT: 67 IN | BODY MASS INDEX: 18.83 KG/M2

## 2021-05-06 DIAGNOSIS — F50.00 ANOREXIA NERVOSA: ICD-10-CM

## 2021-05-06 DIAGNOSIS — F41.1 GENERALIZED ANXIETY DISORDER WITH PANIC ATTACKS: ICD-10-CM

## 2021-05-06 DIAGNOSIS — F31.81 BIPOLAR 2 DISORDER, MAJOR DEPRESSIVE EPISODE (HCC): ICD-10-CM

## 2021-05-06 DIAGNOSIS — F41.0 GENERALIZED ANXIETY DISORDER WITH PANIC ATTACKS: ICD-10-CM

## 2021-05-06 DIAGNOSIS — F41.9 ANXIETY: ICD-10-CM

## 2021-05-06 PROCEDURE — 90833 PSYTX W PT W E/M 30 MIN: CPT | Mod: 95,CR | Performed by: PSYCHIATRY & NEUROLOGY

## 2021-05-06 PROCEDURE — 99213 OFFICE O/P EST LOW 20 MIN: CPT | Mod: 95,CR | Performed by: PSYCHIATRY & NEUROLOGY

## 2021-05-06 RX ORDER — MIRTAZAPINE 30 MG/1
TABLET, FILM COATED ORAL
Qty: 180 TABLET | Refills: 0 | Status: SHIPPED | OUTPATIENT
Start: 2021-05-06 | End: 2021-11-03

## 2021-05-06 RX ORDER — CYPROHEPTADINE HYDROCHLORIDE 4 MG/1
8 TABLET ORAL
Qty: 360 TABLET | Refills: 0 | Status: SHIPPED | OUTPATIENT
Start: 2021-05-06 | End: 2021-08-04

## 2021-05-06 RX ORDER — BUPROPION HYDROCHLORIDE 150 MG/1
150 TABLET ORAL EVERY MORNING
Qty: 90 TABLET | Refills: 0 | Status: SHIPPED | OUTPATIENT
Start: 2021-05-06 | End: 2021-08-04

## 2021-05-06 RX ORDER — QUETIAPINE FUMARATE 100 MG/1
100 TABLET, FILM COATED ORAL
Qty: 90 TABLET | Refills: 0 | Status: SHIPPED | OUTPATIENT
Start: 2021-05-06 | End: 2021-08-04

## 2021-05-06 ASSESSMENT — FIBROSIS 4 INDEX: FIB4 SCORE: 0.4

## 2021-05-06 NOTE — PROGRESS NOTES
[facility] PSYCHIATRIC FOLLOW-UP NOTE    This provider informed the patient their medical records are totally confidential except for the use by other providers involved in their care, or if the patient signs a release, or to report instances of child or elder abuse, or if it is determined they are an immediate risk to harm themselves or others.  To avoid spread of covid-19 virus this appointment was conducted by telehealth.  The patient gave consent to have this follow up session by video telehealth.    Time at beginning of call:  2:30 PM    TOTAL FACE-TO-FACE TIME  30 minutes    CHIEF COMPLAINT       Having mood swings, generalized anxiety, panic attacks, and eating disorder (anorexia).  HISTORY OF PRESENT ILLNESS       The patient was referred by her PC provider Ruby Muñoz PA-C because of Generalized Anxiety Disorder with Panic Attacks, Bipolar 2 Disorder, and decreased appetite and weight due to Anorexia Nervosa..  She has had generalized anxiety disorder for years characterized by excessive worry and concern that she cannot control, feeling on edge or restless, irritability, muscle tension in her neck, shoulders, and upper back, and difficulty relaxing or  turning off her worries in order to sleep.  She has had panic attacks over the last 6 to 8 months 3 to 4 times a week.  She had a lot of anxiety as a child and was started on fluoxetine when she was 8yo.       She was diagnosed as having Bipolar 2 Disorder 2 1/2 years ago and was started on quetiapine.  She took lithium for a short while but didn't drink enough water so it was discontinued.  She is getting angry and frustrated over little things.       She has energized episodes when she starts a lot of projects and wants to go a bunch of different places, she always has racing thoughts, and she had loss of need for sleep (sleeping 2 hours or less sleep/night X 2 to 3 nights in a row), impulsive  spending, hypersexuality, she is extremely distractible, she feels euphoric.       She has persistent depressive disorder and chronic low grade depressed mood and feelings of inadequacy   and low self-esteem, poor appetite, insomnia, low energy, fatigue, poor concentration, and feelings of hopelessness because of her chronic inability to gain weight.  She also has premenstrual dysphoric disorder in the week before her menses.       She has a BMI of 17.75 and is 5 feet 6 inches and 110 pounds.  She denies binging, purging, and privation,  But she has body image distortion and feels fat and believes her stomach is a little bulging, and she get embarassed about weighing too much weight.  However, she has had intractable nausea, abdominal pain, and recurrent bouts of watery diarrhea.  She had a pregnancy in July 2020 and had hyperemesis gravidarum.  She terminated the pregnancy at [facility] on 09/21/2020.         She has gained 20 pounds and she has a lot of support from her boyfriend.  She currently weighs 120 pounds.       She regularly smokes marijuana to partially stimulate appetite.  She has no apparent physical cause of decreased appetite, no need to break away from a restrictive family environment, and no abuse of laxatives. She exercises 30 minutes a day to a video.  PSYCHOSOCIAL CHANGES SINCE PREVIOUS CONTACT       The patient put a white board on her frig and uses it to plan and to organize her activities.  RESPONSE TO TREATMENT       Having a stable mood on quetiapine 100mg po QHS.  PAST PSYCHIATRIC MEDICATIONS      Quetiapine, bupropion, clonazepam, cyproheptadine, lithium, mirtazapine  MEDICATION SIDE EFFECTS       Having some difficulty getting all the information she is told in a conversation.    MEDICAL REVIEW OF SYSTEMS:   Constitutional positive - low body weight, BMI = 17.75   Eyes negative   Ears/Nose/Mouth/Throat negative   Cardiovascular negative   Respiratory negative   Gastrointestinal  positive - epigastric pain   Genitourinary positive - amenorrhea   Muscular negative   Integumentary negative   Neurological negative   Endocrine negative   Hematologic/Lymphatic negative        MENTAL STATUS EVALUATION  LMP 02/12/2021 (Exact Date)   Participation: Active verbal participation  Grooming:Casual  Orientation: Fully Oriented  Eye contact: Good  Behavior:Tense   Mood: Euthymic  Affect: Full range  Thought process: Logical  Thought content:  Within normal limits  Speech: Rate within normal limits and Volume within normal limits  Perception:  Within normal limits  Memory:  No gross evidence of memory deficits  Insight: Good  Judgment: Good  Family/couple interaction observations:   Other:  Depression Screen (PHQ-2/PHQ-9) 12/8/2020   PHQ-2 Total Score 2   PHQ-9 Total Score 6       Interpretation of PHQ-9 Total Score   Score Severity   1-4 No Depression   5-9 Mild Depression   10-14 Moderate Depression   15-19 Moderately Severe Depression   20-27 Severe Depression  Current risk:    Suicide: Low   Homicide: Not applicable   Self-harm: Low  Relapse: Moderate  Other:   Crisis Safety Plan reviewed?No  If evidence of imminent risk is present, intervention/plan:    Medical Records/Labs/Diagnostic Tests Reviewed: yes    Medical Records/Labs/Diagnostic Tests Ordered: no    DIAGNOSTIC IMPRESSIONS       (1) Bipolar 2 Disorder, Depressed (F31.81)       (2) Generalized Anxiety Disorder with Panic Attacks (F41.1)       (3) Anorexia Nervosa (F50.01)    ASSESSMENT AND PLAN       Having too little energy and motivation.  She was started on bupropion XL 150mg po daily.       Continue bupropion XL 150mg po daily.       Continue mirtazapine 60mg po QHS.       Continue quetiapine 100po QHS.       Continue cyproheptadine 4mg po QID (for appetite)       RTC to  Clinic in 2 months for 30 min follow up with this provider.    Time at end of call:  3:00 PM    30 minutes spent in psychotherapy  Topics addressed in psychotherapy  include:  How anorexia is all about control.  Discussed adverse childhood events which may have been related to anorexia.  How patients with anorexia actually have distortions in their vision.

## 2021-05-11 ENCOUNTER — TELEMEDICINE (OUTPATIENT)
Dept: BEHAVIORAL HEALTH | Facility: CLINIC | Age: 26
End: 2021-05-11
Payer: COMMERCIAL

## 2021-05-11 DIAGNOSIS — Z71.89 GRIEF COUNSELING: ICD-10-CM

## 2021-05-11 DIAGNOSIS — F31.81 BIPOLAR 2 DISORDER, MAJOR DEPRESSIVE EPISODE (HCC): ICD-10-CM

## 2021-05-11 DIAGNOSIS — F41.0 GENERALIZED ANXIETY DISORDER WITH PANIC ATTACKS: ICD-10-CM

## 2021-05-11 DIAGNOSIS — F41.1 GENERALIZED ANXIETY DISORDER WITH PANIC ATTACKS: ICD-10-CM

## 2021-05-11 DIAGNOSIS — F50.00 ANOREXIA NERVOSA: ICD-10-CM

## 2021-05-11 PROCEDURE — 90834 PSYTX W PT 45 MINUTES: CPT | Mod: 95 | Performed by: MARRIAGE & FAMILY THERAPIST

## 2021-05-11 NOTE — BH THERAPY
[facility]  Therapy Progress Note      This evaluation was conducted via Zoom using secure and encrypted videoconferencing technology. The patient was in a private location in the [facility].    The patient's identity was confirmed and verbal consent was obtained for this virtual visit.     Patient Name: Nanci Spencer  Patient MRN: [id number]  Today's Date: 5/11/2021     Type of session:Individual psychotherapy  Length of session: 45 minutes  Persons in attendance:Patient    Subjective/New Info: Ms. Christine reports feeling angry over little things and would like to work on practicing skills to reduce stress and better manage frustrations and anger.     She was practicing lifestyle enrichedment and stress management skills and goals.    Patient also openly expressed that she is receptive to spiritual wellness and trying to stay positive and hopeful. Practicing eating healthy meals.    Narrative therapy of emotional triggers or work related stresses. Practice assertive communication skills and positively resolve relational conflicts.     She also has insight on identifying thoughts and feelings that triggers frustrations/anger that may be induced by stressful situations or relationships.       Objective/Observations:   Participation: Active verbal participation and Open to feedback   Grooming: Casual   Cognition: Fully Oriented   Eye contact: Good   Mood: Depressed and Anxious   Affect: Full range and Congruent with content   Thought process: Logical   Speech: Rate within normal limits   Other:     Diagnoses:   1. Anorexia nervosa    2. Bipolar 2 disorder, major depressive episode (HCC)    3. Generalized anxiety disorder with panic attacks    4. Grief counseling         Current risk:   SUICIDE: Not applicable   Homicide: Not applicable   Self-harm: Not applicable   Relapse: Not applicable   Other:    Safety Plan reviewed? Not Indicated   If evidence of imminent risk is present, intervention/plan:      Therapeutic Intervention(s): Cognitive modification, Develop/modify treatment plan, Distress tolerance skills, Interpersonal effectiveness skills, Limit-setting, Maladaptive behavior addressed, Positive behavior reinforced, Self-care skills, Stressors assessed, Supportive psychotherapy, Therapeutic relationship and Therapeutic storytelling    Treatment Goal(s)/Objective(s) addressed:   Life Style Self Enrichment Skills:  1. Positive affirmations daily  1. Assertive communication and setting healthy boundaries  2. Honoring thoughts and emotions no longer 30 mins/awareness/acknowledgement  3. Healthy self-distractions (hobbies/activities)  4. Taking time for self/self-reflection/unplug  5. Meditation and relaxation skills/deep breathings  6. Setting healthy limits in life/when to walk away/forgiveness  7. Health and Fitness etc.  8. Spirituality and Ev    Progress toward Treatment Goals: Mild improvement    Plan:  - Patient is in agreement with the above plan:  YES    JORGITO Khalil  5/11/2021

## 2021-05-25 ENCOUNTER — HOSPITAL ENCOUNTER (OUTPATIENT)
Dept: LAB | Facility: MEDICAL CENTER | Age: 26
End: 2021-05-25
Attending: PHYSICIAN ASSISTANT
Payer: COMMERCIAL

## 2021-05-25 ENCOUNTER — OFFICE VISIT (OUTPATIENT)
Dept: MEDICAL GROUP | Facility: LAB | Age: 26
End: 2021-05-25
Payer: COMMERCIAL

## 2021-05-25 VITALS
RESPIRATION RATE: 16 BRPM | BODY MASS INDEX: 18.11 KG/M2 | DIASTOLIC BLOOD PRESSURE: 62 MMHG | TEMPERATURE: 98.7 F | SYSTOLIC BLOOD PRESSURE: 96 MMHG | OXYGEN SATURATION: 96 % | HEART RATE: 97 BPM | HEIGHT: 67 IN | WEIGHT: 115.4 LBS

## 2021-05-25 DIAGNOSIS — R35.0 URINARY FREQUENCY: ICD-10-CM

## 2021-05-25 DIAGNOSIS — R53.83 FATIGUE, UNSPECIFIED TYPE: ICD-10-CM

## 2021-05-25 LAB
ALBUMIN SERPL BCP-MCNC: 4.5 G/DL (ref 3.2–4.9)
ALBUMIN/GLOB SERPL: 1.6 G/DL
ALP SERPL-CCNC: 81 U/L (ref 30–99)
ALT SERPL-CCNC: 13 U/L (ref 2–50)
ANION GAP SERPL CALC-SCNC: 11 MMOL/L (ref 7–16)
APPEARANCE UR: CLEAR
AST SERPL-CCNC: 15 U/L (ref 12–45)
BASOPHILS # BLD AUTO: 0.7 % (ref 0–1.8)
BASOPHILS # BLD: 0.06 K/UL (ref 0–0.12)
BILIRUB SERPL-MCNC: 0.2 MG/DL (ref 0.1–1.5)
BILIRUB UR STRIP-MCNC: NEGATIVE MG/DL
BUN SERPL-MCNC: 16 MG/DL (ref 8–22)
CALCIUM SERPL-MCNC: 9.5 MG/DL (ref 8.5–10.5)
CHLORIDE SERPL-SCNC: 104 MMOL/L (ref 96–112)
CO2 SERPL-SCNC: 25 MMOL/L (ref 20–33)
COLOR UR AUTO: YELLOW
CREAT SERPL-MCNC: 0.84 MG/DL (ref 0.5–1.4)
EOSINOPHIL # BLD AUTO: 0.05 K/UL (ref 0–0.51)
EOSINOPHIL NFR BLD: 0.6 % (ref 0–6.9)
ERYTHROCYTE [DISTWIDTH] IN BLOOD BY AUTOMATED COUNT: 43.9 FL (ref 35.9–50)
FERRITIN SERPL-MCNC: 32.9 NG/ML (ref 10–291)
GLOBULIN SER CALC-MCNC: 2.8 G/DL (ref 1.9–3.5)
GLUCOSE SERPL-MCNC: 93 MG/DL (ref 65–99)
GLUCOSE UR STRIP.AUTO-MCNC: NEGATIVE MG/DL
HBA1C MFR BLD: 5 % (ref 0–5.6)
HCT VFR BLD AUTO: 43.8 % (ref 37–47)
HGB BLD-MCNC: 14.1 G/DL (ref 12–16)
IMM GRANULOCYTES # BLD AUTO: 0.02 K/UL (ref 0–0.11)
IMM GRANULOCYTES NFR BLD AUTO: 0.2 % (ref 0–0.9)
INT CON NEG: NEGATIVE
INT CON POS: POSITIVE
IRON SATN MFR SERPL: 35 % (ref 15–55)
IRON SERPL-MCNC: 113 UG/DL (ref 40–170)
KETONES UR STRIP.AUTO-MCNC: 15 MG/DL
LEUKOCYTE ESTERASE UR QL STRIP.AUTO: NEGATIVE
LYMPHOCYTES # BLD AUTO: 2.2 K/UL (ref 1–4.8)
LYMPHOCYTES NFR BLD: 25.4 % (ref 22–41)
MAGNESIUM SERPL-MCNC: 2.2 MG/DL (ref 1.5–2.5)
MCH RBC QN AUTO: 29.4 PG (ref 27–33)
MCHC RBC AUTO-ENTMCNC: 32.2 G/DL (ref 33.6–35)
MCV RBC AUTO: 91.3 FL (ref 81.4–97.8)
MONOCYTES # BLD AUTO: 0.46 K/UL (ref 0–0.85)
MONOCYTES NFR BLD AUTO: 5.3 % (ref 0–13.4)
NEUTROPHILS # BLD AUTO: 5.87 K/UL (ref 2–7.15)
NEUTROPHILS NFR BLD: 67.8 % (ref 44–72)
NITRITE UR QL STRIP.AUTO: NEGATIVE
NRBC # BLD AUTO: 0 K/UL
NRBC BLD-RTO: 0 /100 WBC
PH UR STRIP.AUTO: 6 [PH] (ref 5–8)
PLATELET # BLD AUTO: 238 K/UL (ref 164–446)
PMV BLD AUTO: 10.1 FL (ref 9–12.9)
POTASSIUM SERPL-SCNC: 4.2 MMOL/L (ref 3.6–5.5)
PROT SERPL-MCNC: 7.3 G/DL (ref 6–8.2)
PROT UR QL STRIP: NEGATIVE MG/DL
RBC # BLD AUTO: 4.8 M/UL (ref 4.2–5.4)
RBC UR QL AUTO: NEGATIVE
SODIUM SERPL-SCNC: 140 MMOL/L (ref 135–145)
SP GR UR STRIP.AUTO: 1.03
T3FREE SERPL-MCNC: 3.43 PG/ML (ref 2–4.4)
T4 FREE SERPL-MCNC: 1.08 NG/DL (ref 0.93–1.7)
TIBC SERPL-MCNC: 319 UG/DL (ref 250–450)
TSH SERPL DL<=0.005 MIU/L-ACNC: 2.29 UIU/ML (ref 0.38–5.33)
UIBC SERPL-MCNC: 206 UG/DL (ref 110–370)
UROBILINOGEN UR STRIP-MCNC: 0.2 MG/DL
VIT B12 SERPL-MCNC: 690 PG/ML (ref 211–911)
WBC # BLD AUTO: 8.7 K/UL (ref 4.8–10.8)

## 2021-05-25 PROCEDURE — 82306 VITAMIN D 25 HYDROXY: CPT

## 2021-05-25 PROCEDURE — 84481 FREE ASSAY (FT-3): CPT

## 2021-05-25 PROCEDURE — 83036 HEMOGLOBIN GLYCOSYLATED A1C: CPT | Performed by: PHYSICIAN ASSISTANT

## 2021-05-25 PROCEDURE — 85025 COMPLETE CBC W/AUTO DIFF WBC: CPT

## 2021-05-25 PROCEDURE — 99212 OFFICE O/P EST SF 10 MIN: CPT | Performed by: PHYSICIAN ASSISTANT

## 2021-05-25 PROCEDURE — 83550 IRON BINDING TEST: CPT

## 2021-05-25 PROCEDURE — 83540 ASSAY OF IRON: CPT

## 2021-05-25 PROCEDURE — 84439 ASSAY OF FREE THYROXINE: CPT

## 2021-05-25 PROCEDURE — 80053 COMPREHEN METABOLIC PANEL: CPT

## 2021-05-25 PROCEDURE — 82728 ASSAY OF FERRITIN: CPT

## 2021-05-25 PROCEDURE — 83735 ASSAY OF MAGNESIUM: CPT

## 2021-05-25 PROCEDURE — 81002 URINALYSIS NONAUTO W/O SCOPE: CPT | Performed by: PHYSICIAN ASSISTANT

## 2021-05-25 PROCEDURE — 82607 VITAMIN B-12: CPT

## 2021-05-25 PROCEDURE — 84443 ASSAY THYROID STIM HORMONE: CPT

## 2021-05-25 PROCEDURE — 36415 COLL VENOUS BLD VENIPUNCTURE: CPT

## 2021-05-25 ASSESSMENT — FIBROSIS 4 INDEX: FIB4 SCORE: 0.4

## 2021-05-25 NOTE — PROGRESS NOTES
Subjective:   Nanci Spencer is a 25 y.o. female here today for eval of urinary frequency     Urinary frequency  New concern, pt reports worsening urinary frequency and nocturia worsening the past 1 week.   Denies overt dysuria.   No hematuria.   Nausea that is unrelated.     No abdominal pain or flank pain.   No results found for: HBA1C     Distant family history of Diabetes (great aunt)            Current medicines (including changes today)  Current Outpatient Medications   Medication Sig Dispense Refill   • mirtazapine (REMERON) 30 MG Tab tablet Take 2 tabs by mouth at bedtime 180 tablet 0   • QUEtiapine (SEROQUEL) 100 MG Tab Take 1 tablet by mouth at bedtime for 90 days. 90 tablet 0   • buPROPion (WELLBUTRIN XL) 150 MG XL tablet Take 1 tablet by mouth every morning for 90 days. 90 tablet 0   • cyproheptadine (PERIACTIN) 4 MG Tab Take 2 Tablets by mouth 2 times a day for 90 days. 360 tablet 0   • ondansetron (ZOFRAN ODT) 4 MG TABLET DISPERSIBLE Take 1 Tab by mouth every 6 hours as needed for Nausea. 20 Tab 0     No current facility-administered medications for this visit.     She  has a past medical history of Anxiety. She also has no past medical history of Addisons disease ([facility]), Adrenal disorder ([facility]), Allergy, Anemia, Arrhythmia, Arthritis, Asthma, Blood transfusion without reported diagnosis, Cancer ([facility]), Cataract, CHF (congestive heart failure) ([facility]), Clotting disorder ([facility]), COPD (chronic obstructive pulmonary disease) ([facility]), Cushings syndrome ([facility]), Depression, Diabetes ([facility]), Diabetic neuropathy ([facility]), GERD (gastroesophageal reflux disease), Glaucoma, Goiter, Head ache, Heart attack ([facility]), Heart murmur, HIV (human immunodeficiency virus infection) ([facility]), Hyperlipidemia, Hypertension, IBD (inflammatory bowel disease), Kidney disease, Meningitis, Migraine, Muscle disorder, Osteoporosis, Parathyroid disorder ([facility]), Pituitary disease ([facility]), Pulmonary emphysema ([facility]), Seizure ([facility]), Sickle cell disease ([facility]),  "Stroke (HCC), Substance abuse (HCC), Thyroid disease, Tuberculosis, or Urinary tract infection.    ROS  As per HPI  No chest pain, no shortness of breath, no abdominal pain       Objective:     BP (!) 96/62 (BP Location: Left arm, Patient Position: Sitting, BP Cuff Size: Adult)   Pulse 97   Temp 37.1 °C (98.7 °F) (Temporal)   Resp 16   Ht 1.702 m (5' 7\")   Wt 52.3 kg (115 lb 6.4 oz)   SpO2 96%  Body mass index is 18.07 kg/m².   Physical Exam:  Constitutional: Alert, no distress.  Skin: Warm, dry, good turgor, no rashes in visible areas.  Eye: Equal, round, conjunctiva clear, lids normal.  Neck: Trachea midline,   Respiratory: Unlabored respiratory effort, lungs clear to auscultation, no wheezes, no ronchi.  Cardiovascular: Normal S1, S2, no murmur, no edema.  Abdomen: Soft, non-tender, no masses, no hepatosplenomegaly. No CVA tenderness, b/l   Psych: Alert and oriented x3, normal affect and mood.        Assessment and Plan:   The following treatment plan was discussed    1. Urinary frequency  New concern  Pt has been drinking a lot more fluids.   UA is normal   A1c is normal as well.   Continue to monitor symptoms.   - POCT Urinalysis  - POCT Hemoglobin A1C    2. Fatigue, unspecified type  Continue to have increased fatigue will r/o vitamin deficiency as well as thyroid disorder.   - CBC WITH DIFFERENTIAL; Future  - Comp Metabolic Panel; Future  - TSH; Future  - FREE THYROXINE; Future  - T3 FREE; Future  - VITAMIN D,25 HYDROXY; Future  - VITAMIN B12; Future  - MAGNESIUM; Future  - FERRITIN; Future  - IRON/TOTAL IRON BIND; Future      Followup: Return if symptoms worsen or fail to improve.       Ruby Muñoz P.A.-C.  Supervising MD: Dr. Lb Dean MD  05/25/21      "

## 2021-05-25 NOTE — ASSESSMENT & PLAN NOTE
New concern, pt reports worsening urinary frequency and nocturia worsening the past 1 week.   Denies overt dysuria.   No hematuria.   Nausea that is unrelated.     No abdominal pain or flank pain.   No results found for: HBA1C     Distant family history of Diabetes (great aunt)

## 2021-05-27 LAB — 25(OH)D3 SERPL-MCNC: 27 NG/ML (ref 30–80)

## 2021-08-10 ENCOUNTER — TELEMEDICINE (OUTPATIENT)
Dept: BEHAVIORAL HEALTH | Facility: CLINIC | Age: 26
End: 2021-08-10
Payer: COMMERCIAL

## 2021-08-10 DIAGNOSIS — F41.0 GENERALIZED ANXIETY DISORDER WITH PANIC ATTACKS: ICD-10-CM

## 2021-08-10 DIAGNOSIS — F50.00 ANOREXIA NERVOSA: ICD-10-CM

## 2021-08-10 DIAGNOSIS — F31.81 BIPOLAR 2 DISORDER, MAJOR DEPRESSIVE EPISODE (HCC): ICD-10-CM

## 2021-08-10 DIAGNOSIS — F41.1 GENERALIZED ANXIETY DISORDER WITH PANIC ATTACKS: ICD-10-CM

## 2021-08-10 PROCEDURE — 99214 OFFICE O/P EST MOD 30 MIN: CPT | Mod: 95 | Performed by: PSYCHIATRY & NEUROLOGY

## 2021-08-10 RX ORDER — QUETIAPINE FUMARATE 100 MG/1
100 TABLET, FILM COATED ORAL
Qty: 90 TABLET | Refills: 0 | Status: SHIPPED | OUTPATIENT
Start: 2021-08-10 | End: 2021-11-03

## 2021-08-10 RX ORDER — MIRTAZAPINE 30 MG/1
60 TABLET, FILM COATED ORAL
Qty: 180 TABLET | Refills: 0 | Status: SHIPPED | OUTPATIENT
Start: 2021-08-10 | End: 2021-11-04 | Stop reason: SDUPTHER

## 2021-08-10 RX ORDER — BUPROPION HYDROCHLORIDE 150 MG/1
150 TABLET ORAL EVERY MORNING
Qty: 90 TABLET | Refills: 0 | Status: SHIPPED | OUTPATIENT
Start: 2021-08-10 | End: 2021-11-08

## 2021-08-10 RX ORDER — CYPROHEPTADINE HYDROCHLORIDE 4 MG/1
4 TABLET ORAL 4 TIMES DAILY
Qty: 360 TABLET | Refills: 0 | Status: SHIPPED | OUTPATIENT
Start: 2021-08-10 | End: 2021-11-08

## 2021-08-10 NOTE — PROGRESS NOTES
[facility] PSYCHIATRIC FOLLOW-UP NOTE    This provider informed the patient their medical records are totally confidential except for the use by other providers involved in their care, or if the patient signs a release, or to report instances of child or elder abuse, or if it is determined they are an immediate risk to harm themselves or others.  In order to avoid spread of covid-19 virus this appointment was conducted by telehealth.  The patient gave consent to have this follow up session by video telehealth.    Time at beginning of call:  9:30 AM    TOTAL FACE-TO-FACE TIME  30 minutes    CHIEF COMPLAINT       Having mood swings, generalized anxiety, panic attacks, and eating disorder (anorexia).  HISTORY OF PRESENT ILLNESS       The patient was referred by her PC provider Ruby Muñoz PA-C because of Generalized Anxiety Disorder with Panic Attacks, Bipolar 2 Disorder, and decreased appetite and weight due to Anorexia Nervosa..  She has had generalized anxiety disorder for years characterized by excessive worry and concern that she cannot control, feeling on edge or restless, irritability, muscle tension in her neck, shoulders, and upper back, and difficulty relaxing or turning off her worries in order to sleep.  She has had panic attacks over the last 6 to 8 months 3 to 4 times a week.  She had a lot of anxiety as a child and was started on fluoxetine when she was 8yo.  She is getting angry and frustrated over little things.       She was diagnosed as having Bipolar 2 Disorder 2 1/2 years ago and was started on quetiapine.  She took lithium for a short while but didn't drink enough water so it was discontinued.  She has energized episodes when she starts a lot of projects and wants to go a bunch of different places, she always has racing thoughts, and she has loss of need for sleep (sleeping 2 hours or less sleep/night X 2 to 3 nights in a row), impulsive  spending, hypersexuality, she is extremely distractible, and she feels euphoric.       She has persistent depressive disorder and chronic low grade depressed mood and feelings of inadequacy   and low self-esteem, poor appetite, insomnia, low energy, fatigue, poor concentration, and feelings of hopelessness because of her chronic inability to gain weight.  She also has premenstrual dysphoric disorder in the week before her menses.       She has a BMI of 17.75 and is 5 feet 6 inches and 110 pounds.  She denies binging, purging, and privation,  But she has body image distortion and feels fat and believes her stomach is a little bulging, and she get embarassed about weighing too much weight.  However, she has had intractable nausea, abdominal pain, and recurrent bouts of watery diarrhea.  She had a pregnancy in July 2020 and had hyperemesis gravidarum.  She terminated the pregnancy at [facility] on 09/21/2020.         She has gained 20 pounds and she has a lot of support from her boyfriend.  She currently weighs 120 pounds.       She regularly smokes marijuana to partially stimulate appetite.  She has no apparent physical cause of decreased appetite, no need to break away from a restrictive family environment, and no abuse of laxatives. She exercises 30 minutes a day to a video.  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  RESPONSE TO TREATMENT       Having a stable mood and less concerned about her body image.  PAST PSYCHIATRIC MEDICATIONS      Quetiapine, bupropion, clonazepam, cyproheptadine, lithium, and mirtazapine.  MEDICATION SIDE EFFECTS       None    MEDICAL REVIEW OF SYSTEMS:   Constitutional positive - low body weight, BMI = 17.75   Eyes negative   Ears/Nose/Mouth/Throat negative   Cardiovascular negative   Respiratory negative   Gastrointestinal positive - epigastric pain    Genitourinary positive - amenorrhea   Muscular negative   Integumentary negative   Neurological negative   Endocrine negative   Hematologic/Lymphatic negative       MENTAL STATUS EVALUATION  LMP 2021 (Exact Date)   Participation: Active verbal participation  Grooming:Neat  Orientation: Fully Oriented  Eye contact: Good  Behavior:Calm   Mood: Depressed  Affect: Constricted  Thought process: Logical  Thought content:  Within normal limits  Speech: Rate within normal limits and Volume within normal limits  Perception:  Within normal limits  Memory:  No gross evidence of memory deficits  Insight: Good  Judgment: Good  Family/couple interaction observations:   Other:  PUSHPA-7 Questionnaire    Feeling nervous, anxious, or on edge: 2   Not being able to stop or control worryin  Worrying too much about different things:  1  Trouble relaxing:  3  Being so restless that it's hard to sit still:  1  Becoming easily annoyed or irritable:  3  Feeling afraid as if something awful might happen: 1   Total:  12    Interpretation of PUSHPA 7 Total Score   Score Severity :  0-4 No Anxiety   5-9 Mild Anxiety  10-14 Moderate Anxiety  15-21 Severe Anxiety    Depression Screen (PHQ-2/PHQ-9) 2020   PHQ-2 Total Score 2   PHQ-9 Total Score 6     Depression Screening    Little interest or pleasure in doing things?    2  Feeling down, depressed , or hopeless?   2  Trouble falling or staying asleep, or sleeping too much?    2  Feeling tired or having little energy?    3  Poor appetite or overeating?    1  Feeling bad about yourself - or that you are a failure or have let yourself or your family down?   1  Trouble concentrating on things, such as reading the newspaper or watching television?   2  Moving or speaking so slowly that other people could have noticed.  Or the opposite - being so fidgety or restless that you have been moving around a lot more than usual?   1   Thoughts that you would be better off dead, or of hurting  yourself?    1  Patient Health Questionnaire Score:   15      If depressive symptoms identified deferred to follow up visit unless specifically addressed in assesment and plan.    Interpretation of PHQ-9 Total Score   Score Severity   1-4 No Depression   5-9 Mild Depression   10-14 Moderate Depression   15-19 Moderately Severe Depression   20-27 Severe Depression    Current risk:    Suicide: Low   Homicide: Not applicable   Self-harm: Low  Relapse: Moderate  Other:   Crisis Safety Plan reviewed?No  If evidence of imminent risk is present, intervention/plan:    Medical Records/Labs/Diagnostic Tests Reviewed: yes    Medical Records/Labs/Diagnostic Tests Ordered: no    DIAGNOSTIC IMPRESSIONS       (1) Bipolar 2 Disorder, Depressed (F31.81)       (2) Generalized Anxiety Disorder with Panic Attacks (F41.1)       (3) Anorexia Nervosa (F50.01)    ASSESSMENT AND PLAN       She was started on bupropion XL 150mg po daily for energy and motivation..       Continue bupropion XL 150mg po daily.       Continue mirtazapine 60mg po QHS.       Continue quetiapine 100po QHS.       Continue cyproheptadine 4mg po QID (for appetite)       RTC to  Clinic in 2 months for 30 min follow up with this provider.    Time at end of call:  10:00 AM    16 minutes spent in psychotherapy exclusive of my E&M.  Topics addressed in psychotherapy include:  How eating disorder is often related to adverse childhood experience (ACE).  She has a more normal appraisal of her body image.  Her quetiapine 100mg po QHS is stopping her racing thoughts.

## 2021-11-03 RX ORDER — MIRTAZAPINE 30 MG/1
TABLET, FILM COATED ORAL
Qty: 180 TABLET | Refills: 0 | Status: SHIPPED | OUTPATIENT
Start: 2021-11-03 | End: 2021-11-30 | Stop reason: SDUPTHER

## 2021-11-03 RX ORDER — QUETIAPINE FUMARATE 100 MG/1
TABLET, FILM COATED ORAL
Qty: 90 TABLET | Refills: 0 | Status: SHIPPED | OUTPATIENT
Start: 2021-11-03 | End: 2021-11-04 | Stop reason: SDUPTHER

## 2021-11-04 RX ORDER — MIRTAZAPINE 30 MG/1
60 TABLET, FILM COATED ORAL
Qty: 180 TABLET | Refills: 0 | Status: SHIPPED | OUTPATIENT
Start: 2021-11-04 | End: 2021-11-30

## 2021-11-04 RX ORDER — QUETIAPINE FUMARATE 100 MG/1
TABLET, FILM COATED ORAL
Qty: 90 TABLET | Refills: 0 | Status: SHIPPED | OUTPATIENT
Start: 2021-11-04 | End: 2021-11-30 | Stop reason: SDUPTHER

## 2021-11-30 ENCOUNTER — TELEMEDICINE (OUTPATIENT)
Dept: BEHAVIORAL HEALTH | Facility: CLINIC | Age: 26
End: 2021-11-30
Payer: COMMERCIAL

## 2021-11-30 DIAGNOSIS — F31.81 BIPOLAR 2 DISORDER, MAJOR DEPRESSIVE EPISODE (HCC): ICD-10-CM

## 2021-11-30 DIAGNOSIS — F41.0 GENERALIZED ANXIETY DISORDER WITH PANIC ATTACKS: ICD-10-CM

## 2021-11-30 DIAGNOSIS — F41.1 GENERALIZED ANXIETY DISORDER WITH PANIC ATTACKS: ICD-10-CM

## 2021-11-30 DIAGNOSIS — F50.00 ANOREXIA NERVOSA: ICD-10-CM

## 2021-11-30 PROCEDURE — 99213 OFFICE O/P EST LOW 20 MIN: CPT | Mod: 95 | Performed by: PSYCHIATRY & NEUROLOGY

## 2021-11-30 PROCEDURE — 90833 PSYTX W PT W E/M 30 MIN: CPT | Mod: 95 | Performed by: PSYCHIATRY & NEUROLOGY

## 2021-11-30 RX ORDER — BUPROPION HYDROCHLORIDE 150 MG/1
150 TABLET ORAL EVERY MORNING
Qty: 90 TABLET | Refills: 0 | Status: SHIPPED | OUTPATIENT
Start: 2021-11-30 | End: 2022-01-27

## 2021-11-30 RX ORDER — CYPROHEPTADINE HYDROCHLORIDE 4 MG/1
4 TABLET ORAL
Qty: 360 TABLET | Refills: 0 | Status: SHIPPED | OUTPATIENT
Start: 2021-11-30 | End: 2022-04-12

## 2021-11-30 RX ORDER — MIRTAZAPINE 30 MG/1
TABLET, FILM COATED ORAL
Qty: 180 TABLET | Refills: 0 | OUTPATIENT
Start: 2021-11-30

## 2021-11-30 RX ORDER — MIRTAZAPINE 30 MG/1
TABLET, FILM COATED ORAL
Qty: 180 TABLET | Refills: 0 | Status: SHIPPED | OUTPATIENT
Start: 2021-11-30 | End: 2022-01-27

## 2021-11-30 RX ORDER — QUETIAPINE FUMARATE 100 MG/1
TABLET, FILM COATED ORAL
Qty: 90 TABLET | Refills: 0 | Status: SHIPPED | OUTPATIENT
Start: 2021-11-30 | End: 2022-04-12

## 2021-11-30 RX ORDER — QUETIAPINE FUMARATE 100 MG/1
TABLET, FILM COATED ORAL
Qty: 90 TABLET | Refills: 0 | OUTPATIENT
Start: 2021-11-30 | End: 2022-03-02

## 2021-11-30 NOTE — PROGRESS NOTES
[facility] PSYCHIATRIC FOLLOW-UP NOTE    This provider informed the patient their medical records are totally confidential except for the use by other providers involved in their care, or if the patient signs a release, or to report instances of child or elder abuse, or if it is determined they are an immediate risk to harm themselves or others.  To avoid spread of covid-19 virus this follow up appointment was conducted face-to-face wearing masks and a face shield.    Time at beginning of session:  10:00 AM    TOTAL FACE-TO-FACE TIME  30 minutes    CHIEF COMPLAINT       Having mood swings, generalized anxiety, panic attacks, and eating disorder (anorexia).  HISTORY OF PRESENT ILLNESS       The patient was referred by her PC provider Ruby Muñoz PA-C because of Generalized Anxiety Disorder with Panic Attacks, Bipolar 2 Disorder, and decreased appetite and weight due to Anorexia Nervosa..  She has had generalized anxiety disorder for years characterized by excessive worry and concern that she cannot control, feeling on edge or restless, irritability, muscle tension in her neck, shoulders, and upper back, and difficulty relaxing or turning off her worries in order to sleep.  She has had panic attacks over the last 6 to 8 months 3 to 4 times a week.  She had a lot of anxiety as a child and was started on fluoxetine when she was 8yo.  She is getting angry and frustrated over little things.       She was diagnosed as having Bipolar 2 Disorder 2 1/2 years ago and was started on quetiapine.  She took lithium for a short while but didn't drink enough water so it was discontinued.  She has energized episodes when she starts a lot of projects and wants to go a bunch of different places, she always has racing thoughts, and she has loss of need for sleep (sleeping 2 hours or less sleep/night X 2 to 3 nights in a row), impulsive spending, hypersexuality, she is extremely  distractible, and she feels euphoric.       She has persistent depressive disorder and chronic low grade depressed mood and feelings of inadequacy and low self-esteem, poor appetite, insomnia, low energy, fatigue, poor concentration, and feelings of hopelessness because of her chronic inability to gain weight.  She also has premenstrual dysphoric disorder in the week before her menses.       She has a BMI of 17.75 and is 5 feet 6 inches and 110 pounds.  She denies binging, purging, and privation,  But she has body image distortion and feels fat and believes her stomach is a little bulging, and she get embarassed about weighing too much weight.  However, she has had intractable nausea, abdominal pain, and recurrent bouts of watery diarrhea.  She had a pregnancy in July 2020 and had hyperemesis gravidarum.  She terminated the pregnancy at [facility] on 09/21/2020.         She has gained 20 pounds and she has a lot of support from her boyfriend.  She currently weighs 120 pounds.  She now weighs about 110 pounds.       She regularly smokes marijuana to partially stimulate appetite.  She has no apparent physical cause of decreased appetite, no need to break away from a restrictive family environment, and no abuse of laxatives. She exercises 30 minutes a day to a video.  PSYCHOSOCIAL CHANGES SINCE PREVIOUS CONTACT       Living with her boyfriend who doesn't get along with his sister who is 5 months pregnant with her second child and not .  She is fighting with CPS because she is using drugs while pregnant.  RESPONSE TO TREATMENT       Having a stable mood and alleviation of anxiety and depression by mirtazapine 60mg po QHS and bupropion XL 150mg po daily.  PAST PSYCHIATRIC MEDICATIONS      Quetiapine, bupropion, clonazepam, cyproheptadine, lithium, and mirtazapine.  MEDICATION SIDE EFFECTS       None    MEDICAL REVIEW OF SYSTEMS:   Constitutional positive - low body weight, BMI = 17.75   Eyes negative    Ears/Nose/Mouth/Throat negative   Cardiovascular negative   Respiratory negative   Gastrointestinal positive - epigastric pain   Genitourinary positive - amenorrhea   Muscular negative   Integumentary negative   Neurological negative   Endocrine negative   Hematologic/Lymphatic negative        MENTAL STATUS EVALUATION  LMP 2021 (Exact Date)   Participation: Active verbal participation  Grooming:Neat  Orientation: Fully Oriented  Eye contact: Good  Behavior:Calm   Mood: Depressed  Affect: Tearful  Thought process: Logical  Thought content:  Within normal limits  Speech: Rate within normal limits and Volume within normal limits  Perception:  Within normal limits  Memory:  No gross evidence of memory deficits  Insight: Adequate  Judgment: Adequate  Family/couple interaction observations:   Other:  PUSHPA-7 Questionnaire    Feeling nervous, anxious, or on edge:  1  Not being able to stop or control worryin  Worrying too much about different things:   2  Trouble relaxin  Being so restless that it's hard to sit still: 1   Becoming easily annoyed or irritable:  3  Feeling afraid as if something awful might happen: 1   Total:  12    Interpretation of PUSHPA 7 Total Score   Score Severity :  0-4 No Anxiety   5-9 Mild Anxiety  10-14 Moderate Anxiety  15-21 Severe Anxiety    Depression Screening    Little interest or pleasure in doing things?    2  Feeling down, depressed , or hopeless?   1  Trouble falling or staying asleep, or sleeping too much?    1  Feeling tired or having little energy?    1  Poor appetite or overeating?    2  Feeling bad about yourself - or that you are a failure or have let yourself or your family down?   1  Trouble concentrating on things, such as reading the newspaper or watching television?   3  Moving or speaking so slowly that other people could have noticed.  Or the opposite - being so fidgety or restless that you have been moving around a lot more than usual?    1  Thoughts that you  would be better off dead, or of hurting yourself?    0  Patient Health Questionnaire Score:   12      If depressive symptoms identified deferred to follow up visit unless specifically addressed in assesment and plan.    Interpretation of PHQ-9 Total Score   Score Severity   1-4 No Depression   5-9 Mild Depression   10-14 Moderate Depression   15-19 Moderately Severe Depression   20-27 Severe Depression    Current risk:    Suicide: Low   Homicide: Not applicable   Self-harm: Low  Relapse: Moderate  Other:   Crisis Safety Plan reviewed?No  If evidence of imminent risk is present, intervention/plan:    Medical Records/Labs/Diagnostic Tests Reviewed: yes    Medical Records/Labs/Diagnostic Tests Ordered: no    DIAGNOSTIC IMPRESSIONS       (1) Bipolar 2 Disorder, Depressed (F31.81)       (2) Generalized Anxiety Disorder with Panic Attacks (F41.1)       (3) Anorexia Nervosa (F50.01)    ASSESSMENT AND PLAN       She was started on bupropion XL 150mg po daily for energy and motivation..       Continue bupropion XL 150mg po daily.       Continue mirtazapine 60mg po QHS.       Continue quetiapine 100po QHS.       Continue cyproheptadine 4mg po QID (for appetite)       RTC to  Clinic in 2 months for 30 min follow up with this provider.    Time at end of session:  10:30 AM    Greater than 16 minutes spent in psychotherapy exclusive of my E&M.  Topics addressed in psychotherapy include:  She has less intense fear of gaining weight.  Discussed how control issues interact with eating disorders.  She continues to regularly smoke marijuana to stimulate appetite.

## 2022-01-27 DIAGNOSIS — F41.0 GENERALIZED ANXIETY DISORDER WITH PANIC ATTACKS: ICD-10-CM

## 2022-01-27 DIAGNOSIS — F41.1 GENERALIZED ANXIETY DISORDER WITH PANIC ATTACKS: ICD-10-CM

## 2022-01-27 DIAGNOSIS — F31.81 BIPOLAR 2 DISORDER, MAJOR DEPRESSIVE EPISODE (HCC): ICD-10-CM

## 2022-01-27 RX ORDER — BUPROPION HYDROCHLORIDE 150 MG/1
TABLET ORAL
Qty: 90 TABLET | Refills: 0 | Status: SHIPPED | OUTPATIENT
Start: 2022-01-27 | End: 2022-04-12

## 2022-01-27 RX ORDER — MIRTAZAPINE 30 MG/1
TABLET, FILM COATED ORAL
Qty: 180 TABLET | Refills: 0 | Status: SHIPPED | OUTPATIENT
Start: 2022-01-27 | End: 2022-04-12

## 2022-02-25 ENCOUNTER — OFFICE VISIT (OUTPATIENT)
Dept: URGENT CARE | Facility: CLINIC | Age: 27
End: 2022-02-25
Payer: COMMERCIAL

## 2022-02-25 VITALS
HEART RATE: 82 BPM | WEIGHT: 110 LBS | BODY MASS INDEX: 17.27 KG/M2 | SYSTOLIC BLOOD PRESSURE: 120 MMHG | RESPIRATION RATE: 16 BRPM | HEIGHT: 67 IN | OXYGEN SATURATION: 97 % | DIASTOLIC BLOOD PRESSURE: 80 MMHG | TEMPERATURE: 98 F

## 2022-02-25 DIAGNOSIS — J06.9 VIRAL URI WITH COUGH: ICD-10-CM

## 2022-02-25 PROCEDURE — 99213 OFFICE O/P EST LOW 20 MIN: CPT | Performed by: STUDENT IN AN ORGANIZED HEALTH CARE EDUCATION/TRAINING PROGRAM

## 2022-02-25 RX ORDER — CETIRIZINE HYDROCHLORIDE 10 MG/1
10 TABLET ORAL DAILY
Qty: 30 TABLET | Refills: 1 | Status: SHIPPED | OUTPATIENT
Start: 2022-02-25

## 2022-02-25 RX ORDER — FLUTICASONE PROPIONATE 50 MCG
2 SPRAY, SUSPENSION (ML) NASAL
Qty: 16 G | Refills: 1 | Status: SHIPPED | OUTPATIENT
Start: 2022-02-25

## 2022-02-25 ASSESSMENT — FIBROSIS 4 INDEX: FIB4 SCORE: 0.44

## 2022-02-25 NOTE — PROGRESS NOTES
Subjective:   CHIEF COMPLAINT  Chief Complaint   Patient presents with   • Cough     X 5 days, cough. Feeling better,  needs work note.        HPI  Nanci Spencer is a 26 y.o. female who presents with a chief complaint of fatigue, runny nose and nasal congestion.  Symptoms started approximately 1 week ago at that time she was also experiencing a cough and sore throat which have resolved.  Patient has tried taking Benadryl which has provided some relief.  No additional modifying factors.  Patient had a home Covid test which was negative.  She is vaccinated against COVID x2.  No booster.  She is requesting a note for work.    REVIEW OF SYSTEMS  General: no fever or chills  GI: no nausea or vomiting  See HPI for further details.    PAST MEDICAL HISTORY  Patient Active Problem List    Diagnosis Date Noted   • Urinary frequency 05/25/2021   • IUD check up 03/29/2021   • Grief counseling 03/10/2021   • Bipolar 2 disorder, major depressive episode (HCC) 12/17/2020   • Anorexia nervosa 12/17/2020   • Generalized anxiety disorder with panic attacks 12/08/2020   • Underweight 12/08/2020   • Decreased appetite 12/08/2020   • Birth control counseling 12/08/2020       SURGICAL HISTORY  patient denies any surgical history    ALLERGIES  No Known Allergies    CURRENT MEDICATIONS  Home Medications     Reviewed by Fernando Tineo Ass't (Medical Assistant) on 02/25/22 at 1109  Med List Status: <None>   Medication Last Dose Status   buPROPion (WELLBUTRIN XL) 150 MG XL tablet Taking Active   cyproheptadine (PERIACTIN) 4 MG Tab Taking Active   mirtazapine (REMERON) 30 MG Tab tablet Taking Active   ondansetron (ZOFRAN ODT) 4 MG TABLET DISPERSIBLE  Active   QUEtiapine (SEROQUEL) 100 MG Tab Taking Active                SOCIAL HISTORY  Social History     Tobacco Use   • Smoking status: Former Smoker     Types: Cigarettes   • Smokeless tobacco: Never Used   Vaping Use   • Vaping Use: Never used   Substance and Sexual Activity   • Alcohol  "use: Yes     Comment: social   • Drug use: Yes     Types: Marijuana     Comment: marijuana   • Sexual activity: Yes     Partners: Male     Birth control/protection: Pill       FAMILY HISTORY  Family History   Problem Relation Age of Onset   • Anxiety disorder Mother    • No Known Problems Father    • No Known Problems Sister    • No Known Problems Sister           Objective:   PHYSICAL EXAM  VITAL SIGNS: /80 (BP Location: Left arm, Patient Position: Sitting, BP Cuff Size: Small adult)   Pulse 82   Temp 36.7 °C (98 °F) (Temporal)   Resp 16   Ht 1.702 m (5' 7\")   Wt 49.9 kg (110 lb)   SpO2 97%   BMI 17.23 kg/m²     Gen: no acute distress, normal voice  Skin: dry, intact, moist mucosal membranes  Lungs: CTAB w/ symmetric expansion  CV: RRR w/o murmurs or clicks  Psych: normal affect, normal judgement, alert, awake    Assessment/Plan:     1. Viral URI with cough     Signs and symptoms consistent with a viral URI and should be self-limiting.  Home Covid test was negative and no indication for repeat testing today.  She is vaccinated against COVID x2.  No booster.  -Ordered Rx for Flonase  -Ordered Rx for Zyrtec  -Instructed to discontinue Benadryl  -Provided note for work  -Return to urgent care any new/worsening symptoms or further questions or concerns.  Patient understood everything discussed.  All questions were answered.    Differential diagnosis, natural history, supportive care, and indications for immediate follow-up discussed. All questions answered. Patient agrees with the plan of care.    Follow-up as needed if symptoms worsen or fail to improve to PCP, Urgent care or Emergency Room.    Please note that this dictation was created using voice recognition software. I have made a reasonable attempt to correct obvious errors, but I expect that there are errors of grammar and possibly content that I did not discover before finalizing the note.         "

## 2022-02-25 NOTE — LETTER
February 25, 2022       Patient: Nanci Spencer   YOB: 1995   Date of Visit: 2/25/2022         To Whom It May Concern:    Nanci Spencer was seen for her doctor's appointment on 2/25/2022 for viral-like symptoms.  She tested negative for Covid.  She is okay to return to work on Monday, 2/28/2022.    If you have any questions or concerns, please don't hesitate to call [phone]          Sincerely,          Lit Guadarrama D.O.  Electronically Signed

## 2022-04-12 DIAGNOSIS — F41.0 GENERALIZED ANXIETY DISORDER WITH PANIC ATTACKS: ICD-10-CM

## 2022-04-12 DIAGNOSIS — F41.1 GENERALIZED ANXIETY DISORDER WITH PANIC ATTACKS: ICD-10-CM

## 2022-04-12 DIAGNOSIS — F50.00 ANOREXIA NERVOSA: ICD-10-CM

## 2022-04-12 DIAGNOSIS — F31.81 BIPOLAR 2 DISORDER, MAJOR DEPRESSIVE EPISODE (HCC): ICD-10-CM

## 2022-04-12 RX ORDER — QUETIAPINE FUMARATE 100 MG/1
TABLET, FILM COATED ORAL
Qty: 90 TABLET | Refills: 0 | Status: SHIPPED | OUTPATIENT
Start: 2022-04-12 | End: 2022-04-25 | Stop reason: SDUPTHER

## 2022-04-12 RX ORDER — BUPROPION HYDROCHLORIDE 150 MG/1
TABLET ORAL
Qty: 90 TABLET | Refills: 0 | Status: SHIPPED | OUTPATIENT
Start: 2022-04-12 | End: 2022-04-25

## 2022-04-12 RX ORDER — CYPROHEPTADINE HYDROCHLORIDE 4 MG/1
4 TABLET ORAL
Qty: 360 TABLET | Refills: 0 | Status: SHIPPED | OUTPATIENT
Start: 2022-04-12 | End: 2022-04-25 | Stop reason: SDUPTHER

## 2022-04-12 RX ORDER — MIRTAZAPINE 30 MG/1
TABLET, FILM COATED ORAL
Qty: 180 TABLET | Refills: 0 | Status: SHIPPED | OUTPATIENT
Start: 2022-04-12 | End: 2022-04-25 | Stop reason: SDUPTHER

## 2022-04-25 ENCOUNTER — TELEMEDICINE (OUTPATIENT)
Dept: BEHAVIORAL HEALTH | Facility: CLINIC | Age: 27
End: 2022-04-25
Payer: COMMERCIAL

## 2022-04-25 DIAGNOSIS — F50.00 ANOREXIA NERVOSA: ICD-10-CM

## 2022-04-25 DIAGNOSIS — F41.0 GENERALIZED ANXIETY DISORDER WITH PANIC ATTACKS: ICD-10-CM

## 2022-04-25 DIAGNOSIS — F41.1 GENERALIZED ANXIETY DISORDER WITH PANIC ATTACKS: ICD-10-CM

## 2022-04-25 DIAGNOSIS — F31.81 BIPOLAR 2 DISORDER, MAJOR DEPRESSIVE EPISODE (HCC): ICD-10-CM

## 2022-04-25 PROCEDURE — 99213 OFFICE O/P EST LOW 20 MIN: CPT | Mod: 95 | Performed by: PSYCHIATRY & NEUROLOGY

## 2022-04-25 PROCEDURE — 90833 PSYTX W PT W E/M 30 MIN: CPT | Mod: 95 | Performed by: PSYCHIATRY & NEUROLOGY

## 2022-04-25 RX ORDER — CYPROHEPTADINE HYDROCHLORIDE 4 MG/1
4 TABLET ORAL
Qty: 360 TABLET | Refills: 0 | Status: SHIPPED | OUTPATIENT
Start: 2022-04-25 | End: 2023-02-27 | Stop reason: SDUPTHER

## 2022-04-25 RX ORDER — BUPROPION HYDROCHLORIDE 300 MG/1
300 TABLET ORAL EVERY MORNING
Qty: 90 TABLET | Refills: 0 | Status: SHIPPED | OUTPATIENT
Start: 2022-04-25 | End: 2022-07-24

## 2022-04-25 RX ORDER — QUETIAPINE FUMARATE 100 MG/1
100 TABLET, FILM COATED ORAL
Qty: 90 TABLET | Refills: 0 | Status: SHIPPED | OUTPATIENT
Start: 2022-04-25 | End: 2023-06-05

## 2022-04-25 RX ORDER — MIRTAZAPINE 30 MG/1
TABLET, FILM COATED ORAL
Qty: 180 TABLET | Refills: 0 | Status: SHIPPED | OUTPATIENT
Start: 2022-04-25 | End: 2023-06-05

## 2022-04-25 NOTE — PROGRESS NOTES
[facility] PSYCHIATRIC FOLLOW-UP NOTE    This provider informed the patient their medical records are totally confidential except for the use by other providers involved in their care, or if the patient signs a release, or to report instances of child or elder abuse, or if it is determined they are an immediate risk to harm themselves or others.  In order to avoid spread of covid-19 virus this appointment was conducted by telehealth.  The patient gave consent to have this follow up session by video telehealth.  The visit was in the home setting.    Time at beginning of call:  8:30 AM    TOTAL FACE-TO-FACE TIME  30 minutes    CHIEF COMPLAINT       Having mood swings, generalized anxiety, panic attacks, and eating disorder (anorexia).  HISTORY OF PRESENT ILLNESS       The patient was referred by her PC provider Ruby Muñoz PA-C because of Generalized Anxiety Disorder with Panic Attacks, Bipolar 2 Disorder, and decreased appetite and weight due to Anorexia Nervosa..  She has had generalized anxiety disorder for years characterized by excessive worry and concern that she cannot control, feeling on edge or restless, irritability, muscle tension in her neck, shoulders, and upper back, and difficulty relaxing or turning off her worries in order to sleep.  She has had panic attacks over the last 6 to 8 months 3 to 4 times a week.  She had a lot of anxiety as a child and was started on fluoxetine when she was 8yo.  She is getting angry and frustrated over little things.       She was diagnosed as having Bipolar 2 Disorder 2 1/2 years ago and was started on quetiapine.  She took lithium for a short while but didn't drink enough water so it was discontinued.  She has energized episodes when she starts a lot of projects and wants to go a bunch of different places, she always has racing thoughts, and she has loss of need for sleep (sleeping 2 hours or less sleep/night X 2  to 3 nights in a row), impulsive spending, hypersexuality, she is extremely distractible, and she feels euphoric.       She has persistent depressive disorder and chronic low grade depressed mood and feelings of inadequacy and low self-esteem, poor appetite, insomnia, low energy, fatigue, poor concentration, and feelings of hopelessness because of her chronic inability to gain weight.  She also has premenstrual dysphoric disorder in the week before her menses.       She has a BMI of 17.75 and is 5 feet 6 inches and 110 pounds.  She denies binging, purging, and privation,  But she has body image distortion and feels fat and believes her stomach is a little bulging, and she get embarassed about weighing too much weight.  However, she has had intractable nausea, abdominal pain, and recurrent bouts of watery diarrhea.  She had a pregnancy in July 2020 and had hyperemesis gravidarum.  She terminated the pregnancy at [facility] on 09/21/2020.         She has gained 20 pounds and she has a lot of support from her boyfriend.  She currently weighs 120 pounds.  She now weighs about 110 pounds.       She regularly smokes marijuana to partially stimulate appetite.  She has no apparent physical cause of decreased appetite, no need to break away from a restrictive family environment, and no abuse of laxatives. She exercises 30 minutes a day to a video.  PSYCHOSOCIAL CHANGES SINCE PREVIOUS CONTACT       Not focusing on self-hygiene and requiring a lot of dental work.  She just completed her first week at [facility].  RESPONSE TO TREATMENT      Having more depression because she needs dental work.   PAST PSYCHIATRIC MEDICATIONS      Quetiapine, bupropion, clonazepam, cyproheptadine, lithium, and mirtazapine.  MEDICATION SIDE EFFECTS       Feeling cyproheptadine increases her cravings for things she cannot eat.  She is on a soft diet.    MEDICAL REVIEW OF SYSTEMS:   Constitutional positive - low body weight, BMI = 17.75   Eyes  negative   Ears/Nose/Mouth/Throat negative   Cardiovascular negative   Respiratory negative   Gastrointestinal positive - epigastric pain   Genitourinary positive - amenorrhea   Muscular negative   Integumentary negative   Neurological negative   Endocrine negative   Hematologic/Lymphatic negative        MENTAL STATUS EVALUATION  There were no vitals taken for this visit.  Participation: Active verbal participation  Grooming:Casual  Orientation: Fully Oriented  Eye contact: Good  Behavior:Tense   Mood: Depressed and Anxious  Affect: Full range  Thought process: Logical  Thought content:  Within normal limits  Speech: Rate within normal limits and Volume within normal limits  Perception:  Within normal limits  Memory:  No gross evidence of memory deficits  Insight: Adequate  Judgment: Adequate  Family/couple interaction observations:   Other:  PUSHPA-7 Questionnaire    Feeling nervous, anxious, or on edge:  3  Not being able to stop or control worrying: 3   Worrying too much about different things:  3  Trouble relaxing:  3  Being so restless that it's hard to sit still: 2   Becoming easily annoyed or irritable:  3  Feeling afraid as if something awful might happen:  3  Total:  20    Interpretation of PUSHPA 7 Total Score   Score Severity :  0-4 No Anxiety   5-9 Mild Anxiety  10-14 Moderate Anxiety  15-21 Severe Anxiety    Depression Screening    Little interest or pleasure in doing things?    2  Feeling down, depressed , or hopeless?   3  Trouble falling or staying asleep, or sleeping too much?    1  Feeling tired or having little energy?    2  Poor appetite or overeating?   3   Feeling bad about yourself - or that you are a failure or have let yourself or your family down?   3  Trouble concentrating on things, such as reading the newspaper or watching television?   2  Moving or speaking so slowly that other people could have noticed.  Or the opposite - being so fidgety or restless that you have been moving around a lot  more than usual?    1  Thoughts that you would be better off dead, or of hurting yourself?   2   Patient Health Questionnaire Score:   19      If depressive symptoms identified deferred to follow up visit unless specifically addressed in assesment and plan.    Interpretation of PHQ-9 Total Score   Score Severity   1-4 No Depression   5-9 Mild Depression   10-14 Moderate Depression   15-19 Moderately Severe Depression   20-27 Severe Depression    Current risk:    Suicide: Low   Homicide: Not applicable   Self-harm: Low  Relapse: Moderate  Other:   Crisis Safety Plan reviewed?No  If evidence of imminent risk is present, intervention/plan:    Medical Records/Labs/Diagnostic Tests Reviewed: yes    Medical Records/Labs/Diagnostic Tests Ordered: no    DIAGNOSTIC IMPRESSIONS       (1) Bipolar 2 Disorder, Depressed (F31.81)       (2) Generalized Anxiety Disorder with Panic Attacks (F41.1)       (3) Anorexia Nervosa (F50.01)    ASSESSMENT AND PLAN       She was started on bupropion XL 150mg po daily for energy and motivation..       Increase bupropion XLdosage to 300mg po daily.       Continue mirtazapine 60mg po QHS.       Continue quetiapine 100po QHS.       Continue cyproheptadine 4mg po QID (for appetite)       RTC to  Clinic in 2 months for 30 min follow up with this provider.    Time at end of call:  9:00 AM    Greater than 16 minutes spent in psychotherapy exclusive of my E&M.  Topics addressed in psychotherapy include:  She is bummed out by needeing a lot of dental work.  Her boyfriend has been encouraging her to eat more.  They are on the second year of their relationship.

## 2023-02-27 DIAGNOSIS — F50.00 ANOREXIA NERVOSA: ICD-10-CM

## 2023-02-27 RX ORDER — CYPROHEPTADINE HYDROCHLORIDE 4 MG/1
4 TABLET ORAL
Qty: 360 TABLET | Refills: 0 | Status: SHIPPED | OUTPATIENT
Start: 2023-02-27 | End: 2023-05-28

## 2023-02-27 NOTE — TELEPHONE ENCOUNTER
Received request via: Pharmacy    Was the patient seen in the last year in this department? Yes    Does the patient have an active prescription (recently filled or refills available) for medication(s) requested? No    Does the patient have [facility] and need 100 day supply (blood pressure, diabetes and cholesterol meds only)? Medication is not for cholesterol, blood pressure or diabetes and Patient does not have SCP

## 2023-03-13 ENCOUNTER — APPOINTMENT (OUTPATIENT)
Dept: MEDICAL GROUP | Facility: CLINIC | Age: 28
End: 2023-03-13
Payer: COMMERCIAL

## 2023-04-16 ENCOUNTER — OFFICE VISIT (OUTPATIENT)
Dept: URGENT CARE | Facility: PHYSICIAN GROUP | Age: 28
End: 2023-04-16
Payer: COMMERCIAL

## 2023-04-16 VITALS
HEART RATE: 70 BPM | SYSTOLIC BLOOD PRESSURE: 104 MMHG | OXYGEN SATURATION: 98 % | BODY MASS INDEX: 16.12 KG/M2 | RESPIRATION RATE: 16 BRPM | WEIGHT: 106.4 LBS | DIASTOLIC BLOOD PRESSURE: 68 MMHG | TEMPERATURE: 97.6 F | HEIGHT: 68 IN

## 2023-04-16 DIAGNOSIS — K52.9 GASTROENTERITIS: ICD-10-CM

## 2023-04-16 PROCEDURE — 99213 OFFICE O/P EST LOW 20 MIN: CPT | Performed by: PHYSICIAN ASSISTANT

## 2023-04-16 ASSESSMENT — FIBROSIS 4 INDEX: FIB4 SCORE: 0.45

## 2023-04-16 ASSESSMENT — ENCOUNTER SYMPTOMS
BLOOD IN STOOL: 0
VOMITING: 1
FLANK PAIN: 0
DIARRHEA: 1
NAUSEA: 1
FEVER: 0
ABDOMINAL PAIN: 1
CHILLS: 0

## 2023-04-16 NOTE — LETTER
April 20, 2023         Patient: Nanci Spencer   YOB: 1995   Date of Visit: 4/16/2023           To Whom it May Concern:    Nanci Spencer was seen in my clinic on 4/16/2023.  Patient can return to work without restrictions.    If you have any questions or concerns, please don't hesitate to call.        Sincerely,           Stephan Aj P.A.-C.  Electronically Signed

## 2023-04-16 NOTE — PROGRESS NOTES
Subjective:   Nanci Spencer is a 27 y.o. female who presents today with   Chief Complaint   Patient presents with    Letter for School/Work     Pt states she's feel better. She was vomiting for two days and today is the first day holding food down. Pt needs a note saying she can go back to work     Abdominal Pain    Emesis    Diarrhea       Abdominal Pain  This is a new problem. The current episode started in the past 7 days. The onset quality is gradual. The problem has been resolved. The pain is located in the generalized abdominal region. Associated symptoms include diarrhea, nausea and vomiting. Pertinent negatives include no dysuria, fever, frequency or hematuria. Treatments tried: prescription medications. The treatment provided moderate relief.   Patient was seen in the ER yesterday and had work-up with x-ray of her chest as well as ultrasound of the abdomen and blood work and no concerning findings that she states.  She states she was sent home on some medication and that seemed to be improving her symptoms that she was able to keep down some food today with no issues.  She states that the hospital thought that it was either food poisoning or hyperemesis.    PMH:  has a past medical history of Anxiety.    She has no past medical history of Addisons disease ([facility]), Adrenal disorder ([facility]), Allergy, Anemia, Arrhythmia, Arthritis, Asthma, Blood transfusion without reported diagnosis, Cancer ([facility]), Cataract, CHF (congestive heart failure) ([facility]), Clotting disorder ([facility]), COPD (chronic obstructive pulmonary disease) ([facility]), Cushings syndrome ([facility]), Depression, Diabetes ([facility]), Diabetic neuropathy ([facility]), GERD (gastroesophageal reflux disease), Glaucoma, Goiter, Head ache, Heart attack ([facility]), Heart murmur, HIV (human immunodeficiency virus infection) ([facility]), Hyperlipidemia, Hypertension, IBD (inflammatory bowel disease), Kidney disease, Meningitis, Migraine, Muscle disorder, Osteoporosis, Parathyroid disorder ([facility]),  "Pituitary disease (HCC), Pulmonary emphysema (HCC), Seizure (HCC), Sickle cell disease (HCC), Stroke (HCC), Substance abuse (HCC), Thyroid disease, Tuberculosis, or Urinary tract infection.  MEDS:   Current Outpatient Medications:     cyproheptadine (PERIACTIN) 4 MG Tab, Take 1 Tablet by mouth 4 times a day for 90 days., Disp: 360 Tablet, Rfl: 0    mirtazapine (REMERON) 30 MG Tab tablet, TAKE 2 TABLETS BY MOUTH AT BEDTIME, Disp: 180 Tablet, Rfl: 0    fluticasone (FLONASE) 50 MCG/ACT nasal spray, Administer 2 Sprays into affected nostril(S) at bedtime., Disp: 16 g, Rfl: 1    ondansetron (ZOFRAN ODT) 4 MG TABLET DISPERSIBLE, Take 1 Tab by mouth every 6 hours as needed for Nausea., Disp: 20 Tab, Rfl: 0    QUEtiapine (SEROQUEL) 100 MG Tab, Take 1 Tablet by mouth at bedtime. (Patient not taking: Reported on 4/16/2023), Disp: 90 Tablet, Rfl: 0    cetirizine (ZYRTEC) 10 MG Tab, Take 1 Tablet by mouth every day. (Patient not taking: Reported on 4/16/2023), Disp: 30 Tablet, Rfl: 1  ALLERGIES: No Known Allergies  SURGHX: No past surgical history on file.  SOCHX:  reports that she has quit smoking. Her smoking use included cigarettes. She has never used smokeless tobacco. She reports current alcohol use. She reports current drug use. Drug: Marijuana.  FH: Reviewed with patient, not pertinent to this visit.       Review of Systems   Constitutional:  Negative for chills and fever.   Gastrointestinal:  Positive for abdominal pain, diarrhea, nausea and vomiting. Negative for blood in stool.        All have resolved   Genitourinary:  Negative for dysuria, flank pain, frequency, hematuria and urgency.      Objective:   /68   Pulse 70   Temp 36.4 °C (97.6 °F) (Temporal)   Resp 16   Ht 1.727 m (5' 8\")   Wt 48.3 kg (106 lb 6.4 oz)   SpO2 98%   BMI 16.18 kg/m²   Physical Exam  Vitals and nursing note reviewed.   Constitutional:       General: She is not in acute distress.     Appearance: Normal appearance. She is " well-developed. She is not ill-appearing or toxic-appearing.   HENT:      Head: Normocephalic and atraumatic.      Right Ear: Hearing normal.      Left Ear: Hearing normal.   Cardiovascular:      Rate and Rhythm: Normal rate and regular rhythm.      Heart sounds: Normal heart sounds.   Pulmonary:      Effort: Pulmonary effort is normal.      Breath sounds: Normal breath sounds.   Abdominal:      General: Bowel sounds are normal. There is no distension.      Palpations: Abdomen is soft.      Tenderness: There is generalized abdominal tenderness. There is no right CVA tenderness, left CVA tenderness, guarding or rebound.   Musculoskeletal:      Comments: Normal movement in all 4 extremities   Skin:     General: Skin is warm and dry.   Neurological:      Mental Status: She is alert.      Coordination: Coordination normal.   Psychiatric:         Mood and Affect: Mood normal.         Assessment/Plan:   Assessment    1. Gastroenteritis  No acute concerning findings at this time and recommend continue with prescriptions that were provided yesterday at the emergency room.  No indication for further work-up at this time.  Patient was provided with a note as she requested.  Differential diagnosis, natural history, supportive care, and indications for immediate follow-up discussed.   Patient given instructions and understanding of medications and treatment.    If not improving in 3-5 days, F/U with PCP or return to UC if symptoms worsen.  Strict ER precautions given with any return of symptoms or worsening of symptoms  Patient agreeable to plan.        Please note that this dictation was created using voice recognition software. I have made every reasonable attempt to correct obvious errors, but I expect that there are errors of grammar and possibly content that I did not discover before finalizing the note.    Stephan Aj PA-C

## 2023-04-16 NOTE — LETTER
April 16, 2023         Patient: Nanci Spencer   YOB: 1995   Date of Visit: 4/16/2023           To Whom it May Concern:    Nanci Spencer was seen in my clinic on 4/16/2023.  Patient can return to work on 4/19/2023.    If you have any questions or concerns, please don't hesitate to call.        Sincerely,           Stephan Aj P.A.-C.  Electronically Signed

## 2023-04-20 ENCOUNTER — TELEPHONE (OUTPATIENT)
Dept: URGENT CARE | Facility: PHYSICIAN GROUP | Age: 28
End: 2023-04-20
Payer: COMMERCIAL

## 2023-05-01 ENCOUNTER — TELEMEDICINE (OUTPATIENT)
Dept: BEHAVIORAL HEALTH | Facility: CLINIC | Age: 28
End: 2023-05-01
Payer: COMMERCIAL

## 2023-05-01 DIAGNOSIS — F41.0 GENERALIZED ANXIETY DISORDER WITH PANIC ATTACKS: ICD-10-CM

## 2023-05-01 DIAGNOSIS — F50.00 ANOREXIA NERVOSA: ICD-10-CM

## 2023-05-01 DIAGNOSIS — F41.1 GENERALIZED ANXIETY DISORDER WITH PANIC ATTACKS: ICD-10-CM

## 2023-05-01 DIAGNOSIS — R63.0 DECREASED APPETITE: ICD-10-CM

## 2023-05-01 PROCEDURE — 99214 OFFICE O/P EST MOD 30 MIN: CPT | Mod: 95 | Performed by: PSYCHIATRY & NEUROLOGY

## 2023-05-01 PROCEDURE — 90833 PSYTX W PT W E/M 30 MIN: CPT | Mod: 95 | Performed by: PSYCHIATRY & NEUROLOGY

## 2023-05-01 RX ORDER — CYPROHEPTADINE HYDROCHLORIDE 4 MG/1
4 TABLET ORAL 3 TIMES DAILY PRN
Qty: 270 TABLET | Refills: 0 | Status: SHIPPED | OUTPATIENT
Start: 2023-05-01

## 2023-05-01 RX ORDER — AMITRIPTYLINE HYDROCHLORIDE 10 MG/1
10 TABLET, FILM COATED ORAL NIGHTLY
Qty: 90 TABLET | Refills: 1 | Status: SHIPPED | OUTPATIENT
Start: 2023-05-01 | End: 2023-06-05

## 2023-05-01 NOTE — PROGRESS NOTES
"[facility]  PSYCHIATRIC FOLLOW-UP NOTE    This evaluation was conducted via Zoom, using secure and encrypted videoconferencing technology. The patient was physically located at their home address in [place], [place], and the physician was located at her home office in [place], [place]. The patient was presented by self. The patient’s identity was confirmed and verbal consent for the telemedicine encounter was obtained.    CC:  Presents for follow up visit for medication evaluation and management    HISTORY OF PRESENT ILLNESS       Nanci Spencer is a 26 yo female, previously referred by her PC provider Ruby Muñoz PA-C because of PUSHPA, Panic Attacks, Bipolar 2 DO, Cannabis Dependence, and anorexia Nervosa with decreased appetite and digestive issues with nausea, presents today to follow up, she is a former patient of Dr. Cheung, has not been seen in approx. 1 year.    The patient reports the following:  She responded well to the Cyproheptadine and would like to restart it.  She went to the ED recently b/c of waking up and vomiting.  She is working to d/c cannabis use, knows it can cause vomiting.  She didn't tolerate the Seroquel or Remeron, made her too sleepy, works 12h shifts.  Endorses anxiety and low mood due to finances and not knowing what she wants to do with her career.  Is working at [employer] and can move up there.  She does [employer] to make extra money.  Finances are a stressor.  She denies SI.  She participated in therapy in the past and it was very helpful.      History from Dr. Cheung's last visit 4/25/22: \"She has had generalized anxiety disorder for years characterized by excessive worry and concern that she cannot control, feeling on edge or restless, irritability, muscle tension in her neck, shoulders, and upper back, and difficulty relaxing or turning off her worries in order to sleep.  She has had panic attacks " over the last 6 to 8 months 3 to 4 times a week.  She had a lot of anxiety as a child and was started on fluoxetine when she was 8yo.  She is getting angry and frustrated over little things.       She was diagnosed as having Bipolar 2 Disorder 2 1/2 years ago and was started on quetiapine.  She took lithium for a short while but didn't drink enough water so it was discontinued.  She has energized episodes when she starts a lot of projects and wants to go a bunch of different places, she always has racing thoughts, and she has loss of need for sleep (sleeping 2 hours or less sleep/night X 2 to 3 nights in a row), impulsive spending, hypersexuality, she is extremely distractible, and she feels euphoric.       She has persistent depressive disorder and chronic low grade depressed mood and feelings of inadequacy and low self-esteem, poor appetite, insomnia, low energy, fatigue, poor concentration, and feelings of hopelessness because of her chronic inability to gain weight.  She also has premenstrual dysphoric disorder in the week before her menses.       She has a BMI of 17.75 and is 5 feet 6 inches and 110 pounds.  She denies binging, purging, and privation,  But she has body image distortion and feels fat and believes her stomach is a little bulging, and she get embarassed about weighing too much weight.  However, she has had intractable nausea, abdominal pain, and recurrent bouts of watery diarrhea.  She had a pregnancy in July 2020 and had hyperemesis gravidarum.  She terminated the pregnancy at [facility] on 09/21/2020.         She has gained 20 pounds and she has a lot of support from her boyfriend.  She currently weighs 120 pounds.  She now weighs about 110 pounds.       She regularly smokes marijuana to partially stimulate appetite.  She has no apparent physical cause of decreased appetite, no need to break away from a restrictive family environment, and no abuse of laxatives. She exercises 30 minutes a  "day to a video.\"    PAST PSYCHIATRIC MEDICATIONS      Quetiapine, bupropion, clonazepam, cyproheptadine, lithium, and mirtazapine.    MEDICAL REVIEW OF SYSTEMS:   Constitutional positive - low body weight, BMI = 17.75   Eyes negative   Ears/Nose/Mouth/Throat negative   Cardiovascular negative   Respiratory negative   Gastrointestinal positive - epigastric pain   Genitourinary positive - amenorrhea   Muscular negative   Integumentary negative   Neurological negative   Endocrine negative   Hematologic/Lymphatic negative        MENTAL STATUS EVALUATION  There were no vitals taken for this visit.  Participation: Active verbal participation  Grooming:Casual  Orientation: Fully Oriented  Eye contact: Good  Behavior:Tense   Mood: Anxious  Affect: Full range  Thought process: Logical  Thought content:  Within normal limits  Speech: Rate within normal limits and Volume within normal limits  Perception:  Within normal limits  Memory:  No gross evidence of memory deficits  Insight: Adequate  Judgment: Adequate    Current risk:    Suicide: Low   Homicide: Not applicable   Self-harm: Low  Relapse: Moderate  Other:     DIAGNOSTIC IMPRESSIONS       (1) Bipolar 2 Disorder, Depressed (F31.81)       (2) Generalized Anxiety Disorder with Panic Attacks (F41.1), worsening       (3) Anorexia Nervosa (F50.01), worsening  (4) Cannabis Dependence  (5) Nausea and vomiting  ASSESSMENT AND PLAN  Restart Cyproheptadine 4mg po TID (for appetite)  Begin Amitriptyline 10 mg QHS  D/C Cannabis use  Reviewed prior visit HPI, histories and treatment plan in preparation for today's visit    Risks, benefits, alternatives and side effects were discussed for all medicines prescribed at this visit.  The patient voiced understanding providing informed consent.  The patient agrees to call the clinic with any questions or concerns, or seek emergent medical care if warranted.      Follow up in 3 to 6 months or call sooner PRN    Greater than 16 minutes of the " visit was spent in psychotherapy.     Psychotherapy include:  Supportive psychotherapy and psychoeducation, topics: mental health history, behavioral strategies for anxiety and cannabis cessation, w/d symptoms of cannabis that last 2 weeks, stressors, breathing exercises.

## 2023-06-05 ENCOUNTER — OFFICE VISIT (OUTPATIENT)
Dept: MEDICAL GROUP | Facility: PHYSICIAN GROUP | Age: 28
End: 2023-06-05
Payer: COMMERCIAL

## 2023-06-05 VITALS
WEIGHT: 113.8 LBS | TEMPERATURE: 98.6 F | HEIGHT: 68 IN | BODY MASS INDEX: 17.25 KG/M2 | OXYGEN SATURATION: 98 % | HEART RATE: 74 BPM | SYSTOLIC BLOOD PRESSURE: 98 MMHG | RESPIRATION RATE: 16 BRPM | DIASTOLIC BLOOD PRESSURE: 58 MMHG

## 2023-06-05 DIAGNOSIS — F41.1 GENERALIZED ANXIETY DISORDER WITH PANIC ATTACKS: ICD-10-CM

## 2023-06-05 DIAGNOSIS — R11.0 CHRONIC NAUSEA: ICD-10-CM

## 2023-06-05 DIAGNOSIS — M79.641 PAIN IN BOTH HANDS: ICD-10-CM

## 2023-06-05 DIAGNOSIS — F41.0 GENERALIZED ANXIETY DISORDER WITH PANIC ATTACKS: ICD-10-CM

## 2023-06-05 DIAGNOSIS — M79.642 PAIN IN BOTH HANDS: ICD-10-CM

## 2023-06-05 PROBLEM — Z30.09 BIRTH CONTROL COUNSELING: Status: RESOLVED | Noted: 2020-12-08 | Resolved: 2023-06-05

## 2023-06-05 PROCEDURE — 3078F DIAST BP <80 MM HG: CPT | Performed by: PHYSICIAN ASSISTANT

## 2023-06-05 PROCEDURE — 3074F SYST BP LT 130 MM HG: CPT | Performed by: PHYSICIAN ASSISTANT

## 2023-06-05 PROCEDURE — 99214 OFFICE O/P EST MOD 30 MIN: CPT | Performed by: PHYSICIAN ASSISTANT

## 2023-06-05 RX ORDER — PROMETHAZINE HYDROCHLORIDE 25 MG/1
SUPPOSITORY RECTAL
COMMUNITY
Start: 2023-04-13 | End: 2023-06-05

## 2023-06-05 RX ORDER — DICYCLOMINE HYDROCHLORIDE 10 MG/1
CAPSULE ORAL
COMMUNITY
Start: 2023-04-14 | End: 2023-06-05

## 2023-06-05 RX ORDER — DIAZEPAM 5 MG/1
TABLET ORAL
COMMUNITY
Start: 2023-04-25 | End: 2023-06-05

## 2023-06-05 RX ORDER — PANTOPRAZOLE SODIUM 40 MG/1
40 TABLET, DELAYED RELEASE ORAL
COMMUNITY
Start: 2023-04-14 | End: 2023-06-05

## 2023-06-05 RX ORDER — METOCLOPRAMIDE 10 MG/1
TABLET ORAL
COMMUNITY
Start: 2023-04-14 | End: 2023-06-05

## 2023-06-05 ASSESSMENT — ENCOUNTER SYMPTOMS
CHILLS: 0
FEVER: 0
SHORTNESS OF BREATH: 0

## 2023-06-05 NOTE — PROGRESS NOTES
"Subjective:     CC: establish care     HPI:   Nanci presents today with    Problem   Chronic Nausea    Chronic, uncontrolled.  Started when she was young.  Work up has been negative.  Suspected it was due to anxiety.  Just started seeing psychiatry.  Does not see a therapist currently (has seen one in the past).       Pain in Both Hands    Chronic, uncontrolled.  Bilateral, R>L.  Started around January 2023.  Used to use her hands a lot where she's gripping and manipulating.  Now in an area where she's using her palms and arms.  Pain is worse: physically being used.  Some numbness/tingling but only if she's pushes through the pain.   strength slightly decreased.  Hasn't had issues with dropping things.  Had tried some ibuprofen and creams - not helping.    Discussed ibuprofen and wrist braces at night if symptoms worsen.         Generalized Anxiety Disorder With Panic Attacks    Chronic, uncontrolled.  Started when she was young.  Work up has been negative.  Suspected it was due to anxiety.  Just started seeing psychiatry.  Does not see a therapist currently (has seen one in the past).  Needs to schedule follow up with psychiatry.       Birth Control Counseling (Resolved)       Health Maintenance: Completed    ROS:  Review of Systems   Constitutional:  Negative for chills and fever.   Respiratory:  Negative for shortness of breath.    Cardiovascular:  Negative for chest pain.   Musculoskeletal:  Positive for joint pain.       Objective:     Exam:  BP 98/58 (BP Location: Right arm, Patient Position: Sitting)   Pulse 74   Temp 37 °C (98.6 °F) (Temporal)   Resp 16   Ht 1.727 m (5' 8\")   Wt 51.6 kg (113 lb 12.8 oz)   SpO2 98%   BMI 17.30 kg/m²  Body mass index is 17.3 kg/m².    Physical Exam  Constitutional:       Appearance: Normal appearance.   Cardiovascular:      Rate and Rhythm: Normal rate and regular rhythm.      Heart sounds: Normal heart sounds.   Pulmonary:      Effort: Pulmonary effort is normal. "      Breath sounds: Normal breath sounds.   Musculoskeletal:      Cervical back: Normal range of motion and neck supple.   Skin:     General: Skin is warm.   Neurological:      General: No focal deficit present.      Mental Status: She is alert.   Psychiatric:         Mood and Affect: Mood normal.             Assessment & Plan:     27 y.o. female with the following -     1. Pain in both hands  Chronic, stable.  Pain only with frequent use.  Discussed ibuprofen and Chelsie braces at night if pain worsens.  Follow-up for worsening or persistent symptoms.    2. Chronic nausea  Chronic, uncontrolled.  Has been worked up by GI in the past.  Was suggested that it was due to her anxiety.  Is currently seeing psychiatry and needs to make follow-up.  Referring patient to therapy today.  Continue cyproheptadine 4 mg up to 3 times a day as needed for appetite stimulant.    3. Generalized anxiety disorder with panic attacks  Chronic, uncontrolled.  Is currently seeing psychiatry and needs to make follow-up.  Referring patient to therapy today.    - Referral to Behavioral Health            Return in about 1 year (around 6/5/2024), or if symptoms worsen or fail to improve.    Please note that this dictation was created using voice recognition software. I have made every reasonable attempt to correct obvious errors, but I expect that there are errors of grammar and possibly content that I did not discover before finalizing the note.